# Patient Record
Sex: FEMALE | Race: WHITE | Employment: OTHER | URBAN - NONMETROPOLITAN AREA
[De-identification: names, ages, dates, MRNs, and addresses within clinical notes are randomized per-mention and may not be internally consistent; named-entity substitution may affect disease eponyms.]

---

## 2018-08-09 ENCOUNTER — HOSPITAL ENCOUNTER (OUTPATIENT)
Dept: MAMMOGRAPHY | Age: 63
Discharge: HOME | End: 2018-08-09
Attending: FAMILY MEDICINE | Admitting: FAMILY MEDICINE

## 2018-08-09 DIAGNOSIS — Z12.31 VISIT FOR SCREENING MAMMOGRAM: ICD-10-CM

## 2019-08-14 ENCOUNTER — HOSPITAL ENCOUNTER (OUTPATIENT)
Dept: MAMMOGRAPHY | Age: 64
Discharge: HOME | End: 2019-08-14
Payer: COMMERCIAL

## 2019-08-14 DIAGNOSIS — Z12.31 SCREENING MAMMOGRAM, ENCOUNTER FOR: ICD-10-CM

## 2019-08-14 PROCEDURE — 77063 BREAST TOMOSYNTHESIS BI: CPT

## 2020-08-17 ENCOUNTER — HOSPITAL ENCOUNTER (OUTPATIENT)
Dept: MAMMOGRAPHY | Age: 65
Discharge: HOME | End: 2020-08-17
Payer: COMMERCIAL

## 2020-08-17 PROCEDURE — 77063 BREAST TOMOSYNTHESIS BI: CPT

## 2021-02-01 ENCOUNTER — OFFICE VISIT (OUTPATIENT)
Dept: INTERNAL MEDICINE CLINIC | Age: 66
End: 2021-02-01
Payer: MEDICARE

## 2021-02-01 VITALS
BODY MASS INDEX: 31.65 KG/M2 | TEMPERATURE: 97.8 F | DIASTOLIC BLOOD PRESSURE: 70 MMHG | OXYGEN SATURATION: 98 % | HEIGHT: 62 IN | SYSTOLIC BLOOD PRESSURE: 120 MMHG | HEART RATE: 67 BPM | WEIGHT: 172 LBS

## 2021-02-01 DIAGNOSIS — J45.990 EXERCISE-INDUCED ASTHMA: ICD-10-CM

## 2021-02-01 DIAGNOSIS — Z78.0 POST-MENOPAUSAL: ICD-10-CM

## 2021-02-01 DIAGNOSIS — M51.36 LUMBAR DEGENERATIVE DISC DISEASE: ICD-10-CM

## 2021-02-01 DIAGNOSIS — E78.5 DYSLIPIDEMIA: ICD-10-CM

## 2021-02-01 DIAGNOSIS — M54.32 LEFT SIDED SCIATICA: Primary | ICD-10-CM

## 2021-02-01 PROBLEM — M51.369 LUMBAR DEGENERATIVE DISC DISEASE: Status: ACTIVE | Noted: 2021-02-01

## 2021-02-01 PROCEDURE — 99204 OFFICE O/P NEW MOD 45 MIN: CPT | Performed by: INTERNAL MEDICINE

## 2021-02-01 RX ORDER — ESTRADIOL 0.5 MG/1
0.5 TABLET ORAL DAILY
COMMUNITY
Start: 2020-09-03 | End: 2021-08-02

## 2021-02-01 RX ORDER — KETOCONAZOLE 20 MG/ML
SHAMPOO TOPICAL
COMMUNITY
Start: 2020-11-18

## 2021-02-01 RX ORDER — KETOCONAZOLE 20 MG/G
CREAM TOPICAL 2 TIMES DAILY
COMMUNITY
End: 2021-02-01

## 2021-02-01 RX ORDER — ESTRADIOL 0.1 MG/G
2 CREAM VAGINAL DAILY
COMMUNITY
End: 2021-08-02

## 2021-02-01 RX ORDER — TRIAMCINOLONE ACETONIDE 1 MG/G
CREAM TOPICAL
COMMUNITY
Start: 2020-11-18

## 2021-02-01 RX ORDER — ALBUTEROL SULFATE 90 UG/1
2 AEROSOL, METERED RESPIRATORY (INHALATION) EVERY 4 HOURS PRN
COMMUNITY
Start: 2020-08-03 | End: 2021-09-15 | Stop reason: SDUPTHER

## 2021-02-01 SDOH — HEALTH STABILITY: MENTAL HEALTH: HOW OFTEN DO YOU HAVE A DRINK CONTAINING ALCOHOL?: NEVER

## 2021-02-01 ASSESSMENT — PATIENT HEALTH QUESTIONNAIRE - PHQ9
1. LITTLE INTEREST OR PLEASURE IN DOING THINGS: 0
SUM OF ALL RESPONSES TO PHQ QUESTIONS 1-9: 0

## 2021-02-01 NOTE — PROGRESS NOTES
2/1/21    Romel Celeste  1955    Chief Complaint   Patient presents with    New Patient     c/o numbness left and right toes       History of Present Illness:  Romel Celeste is a 73 y.o. pleasant lady presenting today to establish care as a new patient with a chief complaint of chronic LBP. She has a past medical history significant for:  HL (, TG 54 on 7/6/2020 care everywhere)  Degenerative disc disease lumbar and L sciatica, on Ibuprofen PRN  Post-menopausal, on hormonal therapy  Exercise-induced asthma (?emphysema), on Albuterol inh PRN   S/p tubal ligation   S/p bilateral carpal tunnel release  S/p tonsillectomy   Former smoker (quit 2008), on Nicorette gum    # Patient with sciatica and DDD lumbar. Intermittent. Better once she retired. She used to be a . Mostly L sciatica is bothering her. Takes Ibuprofen PRN. Did not tolerate Gabapentin. Pain keeps her up at night. She had XR lumbar done 2018. She has done physical therapy in the past.   Also having numbness in L > R toes intermittently. No saddle anesthesia or weakness in lower extremities. No incontinence. # UTD on mammogram done Aug 2020 unremarkable. # Patient had colonoscopy done 10 years ago. Due for colon cancer screening this year. Wants to hold off. # UTD on pap smears. Last one was 2 years ago. # Per patient she was having severe post-menopausal symptoms that her PCP had to place her on hormonal therapy. Was not seeing Gyn for this. # Has exercise-induced asthma. Uses Albuterol inh PRN. Has bronchospasms when she goes to walk in the cold. Albuterol helps. # Continues to chew nicorette gum. Has been unable to quit that. She quit smoking in 2008. Has tried switching to regular chewing gum but unable to tolerate.        Health maintenance:   Health Maintenance Due   Topic Date Due    Hepatitis C screen  1955    HIV screen  01/12/1971    Cervical cancer screen  01/12/1977  Diabetes screen  01/12/1996    Colon cancer screen colonoscopy  01/12/2006    DEXA (modify frequency per FRAX score)  01/12/2011    Lipid screen  07/11/2019    Flu vaccine (1) 09/01/2020    Pneumococcal 65+ years Vaccine (1 of 1 - PPSV23) 01/12/2021    Annual Wellness Visit (AWV)  02/01/2021         Review of Systems:  Constitutional: no fevers, no chills, no night sweats, no weight loss, no weight gain, no fatigue   Pain assessment: chronic LBP and L sciatica   Head: no headaches  Ears: no hearing loss, no tinnitus, no vertigo  Eyes: no blurry vision, no diplopia, no dryness, no itchiness  Mouth: no oral ulcers, no dry mouth, no sore throat  Nose: no nasal congestion, no epistaxis  Cardiac: no chest pain, no palpitations, no leg swelling, no orthopnea, no PND, no syncope  Pulmonary: no dyspnea, no cough, no wheezing, no hemoptysis  GI: no nausea, no vomiting, no diarrhea, no constipation, no abdominal pain, no hematochezia  : no dysuria, no frequency, no urgency, no hematuria, no frothy urine, no dyspareunia, no pelvic pain, no vaginal bleeding, no abnormal vaginal discharge  MSK: no arthralgias, no myalgias, no early morning stiffness, Raynaud's, numbness L > R toes intermittently   Neuro: no focal neurological deficits, no seizures  Sleep: no snoring, no daytime somnolence   Psych: no depression, no anxiety, no suicidal ideation      Physical Exam:  VITALS:   /70   Pulse 67   Temp 97.8 °F (36.6 °C)   Ht 5' 2\" (1.575 m)   Wt 172 lb (78 kg)   SpO2 98%   BMI 31.46 kg/m²     PHYSICAL EXAMINATION:  General: alert, awake, and oriented to time, place, person, and situation. Not in acute distress   Skin:  no suspicious rashes, no jaundice  Head: normocephalic/atraumatic  Eyes: anicteric sclera, well-injected conjunctiva. Pupils are equally round and reactive to light.  Extraocular movements are intact   Nose: no septal deviation evident  Sinuses: no sinus tenderness  Ears: external ears normal  Neck: supple, no cervical lymphadenopathy, thyroid symmetric and not enlarged, no bruits   Heart: regular rate and rhythm, regular S1/S2, no S3/S4, no audible murmurs, no audible friction rub  Lungs: clear to auscultation bilaterally, no audible crackles, no audible wheezes, no audible rhonchi    Abdomen: normal bowel sounds, soft abdomen, non-tender, no palpable masses  Extremities: no edema, warm, no cyanosis, no clubbing. Good capillary refill   MSK: no tenderness across spinous processes, full ROM in all 4 extremities. No joint swelling or tenderness   Peripheral vascular: 2+ pulses symmetric (radial and dorsalis pedis)  Neuro: gait normal, CN II-XII intact, motor power 5/5 in all 4 extremities, sensation intact and symmetric    Labs   I have personally reviewed labs, and discussed pertinent findings with patient     Imaging   I have personally reviewed imaging, and discussed pertinent findings with patient    Other notes  I have personally reviewed other notes, and discussed pertinent findings with patient      Assessment/Plan:     1. Left sided sciatica  2. Lumbar degenerative disc disease  Supportive management recommended such as heating pad, Tylenol/Ibuprofen PRN, topicals  Strongly recommended PT. If no improvement in symptoms may need MRI due to numbness in toes ? claudication (less likely vascular and more likely neurogenic)  - [facility]    3. Dyslipidemia  Stable  , TG 54 in July 2020 care everywhere  Diet controlled  Not on statin therapy     4. Post-menopausal  Stable  Discussed with patient long-term side effects of hormonal therapy and she wishes to continue as prescribed by her prior PCP     5. Exercise-induced asthma  Stable  Continue Albuterol inh PRN       Care discussed with patient and questions answered. Patient verbalizes understanding and agrees with plan. Discussed with patient the importance of continuity of care.  I encouraged patient to schedule next appointment within 6 months with me. Patient prefers to be reached by Phone call at [phone] for future medical correspondence. Encouraged to activate MyChart. I reviewed and reconciled the medications this visit. I reviewed and updated the past medical, surgical, social, and family history during this visit. After visit summary provided.        Morris Beltrán MD  Internal Medicine  2/1/2021   10:01 AM

## 2021-02-10 ENCOUNTER — HOSPITAL ENCOUNTER (OUTPATIENT)
Dept: PHYSICAL THERAPY | Age: 66
Setting detail: THERAPIES SERIES
Discharge: HOME | End: 2021-02-10
Payer: MEDICARE

## 2021-02-10 ENCOUNTER — PATIENT MESSAGE (OUTPATIENT)
Dept: INTERNAL MEDICINE CLINIC | Age: 66
End: 2021-02-10

## 2021-02-10 PROCEDURE — 97110 THERAPEUTIC EXERCISES: CPT

## 2021-02-10 PROCEDURE — 97161 PT EVAL LOW COMPLEX 20 MIN: CPT

## 2021-02-10 ASSESSMENT — PAIN DESCRIPTION - LOCATION: LOCATION: BACK

## 2021-02-10 ASSESSMENT — PAIN DESCRIPTION - ORIENTATION: ORIENTATION: RIGHT;LEFT;LOWER

## 2021-02-10 ASSESSMENT — PAIN SCALES - GENERAL: PAINLEVEL_OUTOF10: 7

## 2021-02-10 ASSESSMENT — PAIN DESCRIPTION - FREQUENCY: FREQUENCY: CONTINUOUS

## 2021-02-10 NOTE — PLAN OF CARE
Outpatient Physical Therapy           [place]           [] Phone: [phone]   Fax: [phone]  [place]           [x] Phone: [phone]   Fax: [phone]     To: Referring Practitioner: Danielle Ornelas    From: Terra Mosqueda, PT     Patient: Candy Dubon       : 1955  Diagnosis: Diagnosis: L sciatica   Treatment Diagnosis:   LBP/ L leg pan  Date: 2/10/2021    Physical Therapy Certification/Re-Certification Form    The following patient has been evaluated for physical therapy services and for therapy to continue, insurance requires physician review of the treatment plan initially and every 90 days. Please review the attached evaluation and/or summary of the patient's plan of care, and verify that you agree therapy should continue by signing the attached document and sending it back to our office. ASSESSMENT  Patient primary complaints: LBP/L leg pain  History of condition:Hx of LBP x 3 years - has had L/R toe numbess x 2 months; has constant LBP  Current functional limitations: : difficulty with carrying bags weighing 40# due to back pain; bathing dog in bath tub  Clinical findings: 10 sit to stands with arms across chest in 30 sec;  Mod Oswestry 16/50;Lumbar: AROM WFL all directions, R/L SB painful on contralateral sides;diffciulty with core contriol actvity when performing double leg bridge  PLOF:, retired , able to walk 3 miles without difficulty  Skilled PT interventions are intended to: address and decrease LBP/L leg pain which will enable patient  to complete all necessary ADL:/IADL in a timely manner without difficulty  Patient agrees with established plan of care and assisted in the development of their  goals  Barriers to learning:none- no mental/cognitive barriers observed  Preferred learning style(s):   written- demonstration -practice  Preferred Language: English  Potential barriers to progress:none  The patient appears motivated to participate in PT  yes  Plan of Care/Treatment to date:  [x] Therapeutic Exercise  [] Modalities:  [x] Therapeutic Activity     [] Ultrasound  [] Electrical Stimulation  [] Gait Training      [] Cervical Traction [] Lumbar Traction  [x] Neuromuscular Re-education    [] Cold/hotpack [] Iontophoresis   [x] Instruction in HEP      [] Vasopneumatic    [] Dry Needling  [x] Manual Therapy               [] Aquatic Therapy       Other:          Frequency/Duration:  # Days per week: [x] 1 day # Weeks: [] 1 week [] 5 weeks     [x] 2 days   [] 2 weeks [x] 6 weeks     [] 3 days   [] 3 weeks [] 7 weeks     [] 4 days   [] 4 weeks [] 8 weeks         [] 9 weeks [] 10 weeks         [] 11 weeks [] 12 weeks    Rehab Potential/Progress: [] Excellent [x] Good [] Fair  [] Poor     Goals:         Long term goals  Time Frame for Long term goals : 6 weeks 3/30/21  Long term goal 1: patient's goal - learn how to take care of back  Long term goal 2: patient will score no greater than 12/50 on Modified Oswestry and will report less difficulty with carrying bags wieghing up to 40# and during homemaking task of bathing her dog in the bathtub  Long term goal 3: patient will be independent with HEP in order to prepared for discharge      Electronically signed by:  Charla Trevizo PT, 2/10/2021, 12:42 PM        If you have any questions or concerns, please don't hesitate to call.   Thank you for your referral.      Physician Signature:________________________________Date:_________ TIME: _____  By signing above, therapists plan is approved by physician

## 2021-02-10 NOTE — FLOWSHEET NOTE
Outpatient Physical Therapy  [place]           [x] Phone: [phone]   Fax: [phone]  [facility]           [] Phone: [phone]   Fax: [phone]        Physical Therapy Daily Treatment Note  Date:  2/10/2021    Patient Name:  Floyd Bishop    :  1955  MRN: [id number]  Restrictions/Precautions: Other position/activity restrictions: none  Diagnosis:   Diagnosis: L sciatica  Date of Injury/Surgery:   Treatment Diagnosis:      LBP/ L leg pan  Insurance/Certification information: PT Insurance Information: needs pre certed through [employer]   Referring Physician:  Referring Practitioner: Ramona Reyes  Next Doctor Visit:    Plan of care signed (Y/N):    Outcome Measure:  10 sit to stands with arms across chest in 30 sec; Mod Oswestry 16/50  Visit# / total visits:   then PN plan expires 3/30/21  Pain level: 7/10  LBP   Goals:          Long term goals  Time Frame for Long term goals : 6 weeks 3/30/21  Long term goal 1: patient's goal - learn how to take care of back  Long term goal 2: patient will score no greater than 12/50 on Modified Oswestry and will report less difficulty with carrying bags wieghing up to 40# and during homemaking task of bathing her dog in the bathtub  Long term goal 3: patient will be independent with HEP in order to prepared for discharge    Summary of Evaluation:   Patient primary complaints: LBP/L leg pain  History of condition:Hx of LBP x 3 years - has had L/R toe numbess x 2 months; has constant LBP  Current functional limitations: : difficulty with carrying bags weighing 40# due to back pain; bathing dog in bath tub  Clinical findings: 10 sit to stands with arms across chest in 30 sec;  Mod Oswestry 16/50;Lumbar: AROM WFL all directions, R/L SB painful on contralateral sides;diffciulty with core contriol actvity when performing double leg bridge  PLOF:, retired , able to walk 3 miles without difficulty  Skilled PT interventions are intended to: address and decrease LBP/L leg pain which will enable patient  to complete all necessary ADL:/IADL in a timely manner without difficulty  Patient agrees with established plan of care and assisted in the development of their  goals  Barriers to learning:none- no mental/cognitive barriers observed  Preferred learning style(s):   written- demonstration -practice  Preferred Language: English  Potential barriers to progress:none  The patient appears motivated to participate in PT  yes      Subjective:  See eval         Any changes in Ambulatory Summary Sheet? None        Objective:  See eval   Prior to today's treatment session, patient was screened for signs and symptoms related to COVID-19 including but not limited to verbally answering questions related to feeling ill, cough, or SOB, along with taking temperature via forehead thermometer. Patient presented with all negative signs and symptoms and had no fever >100 degrees Fahrenheit this date. Exercises: (No more than 4 columns)   Exercise/Equipment Date  2/10/21 Date Date           WARM UP         nustep Seat 8 arms 7 load 3 x 5'           TABLE      Side ABD YTB @ knees x10 reps R/l     Piriformis stretch  hooklying with Ankle on contralateral knee - overpress ure applied to medial knee                          STANDING      FM step outs holding handle @ mid chest 7# to R/l x 5 reps ea                                              PROPRIOCEPTION                                    MODALITIES                      Other Therapeutic Activities/Education:        Home Exercise Program:  Side ABD with YTB and piriformis stretching- patient demonstrated and expressed understanding      Manual Treatments:roller to B piriformis        Modalities:        Communication with other providers:        Assessment:  (Response towards treatment session) (Pain Rating)  Pt tolerated  treatment without any adverse reactions or complications this date.  . Pt would continue to benefit from skilled - - -

## 2021-02-10 NOTE — PROGRESS NOTES
Physical Therapy  Initial Assessment  Date: 2/10/2021  Patient Name: Elana Cardoso  MRN: [id number]  : 1955          Restrictions  Position Activity Restriction  Other position/activity restrictions: none    Subjective   General  Chart Reviewed: Yes  Patient assessed for rehabilitation services?: Yes  Family / Caregiver Present: No  Referring Practitioner: Zackary Ornelas  Diagnosis: L sciatica  Follows Commands: Within Functional Limits  General Comment  Comments: difficulty with carrying bags weighing 40# due to back pain; bathing dog in bath tub   PT Visit Information  PT Insurance Information: needs pre certed through [employer]  Subjective  Subjective: Hx of LBP x 3 years - has had L/R toe numbess x 2 months; has constant LBP  Pain Screening  Patient Currently in Pain: Yes  Pain Assessment  Pain Assessment: 0-10  Pain Level: 7  Pain Type: Chronic pain  Pain Location: Back  Pain Orientation: Right;Left;Lower  Pain Radiating Towards: L leg to ankle  Pain Frequency: Continuous  Clinical Progression: Gradually worsening  Functional Pain Assessment: Prevents or interferes with all active and some passive activities  Vital Signs  Patient Currently in Pain: Yes    Vision/Hearing  Vision  Vision: (glasses)  Hearing  Hearing: Within functional limits    Orientation  Orientation  Overall Orientation Status: Within Normal Limits    Social/Functional History  Social/Functional History  Lives With: Spouse  Type of Home: House  Home Layout: Performs ADL's on one level  ADL Assistance: Independent  Homemaking Assistance: Independent  Ambulation Assistance: Independent  Transfer Assistance: Independent  Active : Yes  Mode of Transportation: Car  Occupation: Retired  Leisure & Hobbies: gardening    Objective     Observation/Palpation  Palpation: lumbar spine TTP    AROM RLE (degrees)  RLE AROM: WNL  AROM LLE (degrees)  LLE AROM : WNL  Spine  Lumbar: AROM WFL all directions, R/L SB painful on conralateral sides    Strength RLE  Strength RLE: Exception  R Hip Extension: At least;4/5;4-/5  R Hip ABduction: At least;4/5;4-/5  Strength LLE  Strength LLE: Exception  L Hip Extension: At least;4/5  L Hip ABduction: At least;4/5  Strength Other  Other: diffciulty with core contriol actvity when performing double leg bridge     Additional Measures  Flexibility: L pirifomis stretch painful                Ambulation  Ambulation?: Yes  Ambulation 1  Surface: carpet  Device: No Device  Gait Deviations: None                            Assessment   Conditions Requiring Skilled Therapeutic Intervention  Body structures, Functions, Activity limitations: Increased pain;Decreased strength  Assessment: 10 sit to stands with arms across chest in 30 sec;  Mod Oswestry 16/50  Prognosis: Good  Decision Making: Low Complexity  History: no PF  Exam: Mod Oswestry, trunk ROM  Clinical Presentation: changing characterstics of function due t0 pain  Barriers to Learning: none  REQUIRES PT FOLLOW UP: Yes         Plan        G-Code       OutComes Score                                                  AM-PAC Score             Goals  Long term goals  Time Frame for Long term goals : 6 weeks 3/30/21  Long term goal 1: patient's goal - learn how to take care of back  Long term goal 2: patient will score no greater than 12/50 on Modified Oswestry and will report less difficulty with carrying bags wieghing up to 40# and during homemaking task of bathing her dog in the bathtub  Long term goal 3: patient will be independent with HEP in order to prepared for discharge       Therapy Time   Individual Concurrent Group Co-treatment   Time In 1025         Time Out 1115         Minutes 50         Timed Code Treatment Minutes: [address] [facility]

## 2021-02-10 NOTE — TELEPHONE ENCOUNTER
From: Candy Dubon  To: Natty Hoyt MD  Sent: 2/10/2021 7:09 AM EST  Subject: Visit Pamelia Sorrow Dr. Beverli Cushing,  I tried Tylenol per your instructions concerning pain relief for DDD and sciatica pain. The Tylenol made me feel like I had restless legs, a really uncomfortable feeling in my legs. Is there another OTC medicine you would recommend I try instead of Ibuprofen?   Hayley Eaton

## 2021-02-24 ENCOUNTER — HOSPITAL ENCOUNTER (OUTPATIENT)
Dept: PHYSICAL THERAPY | Age: 66
Setting detail: THERAPIES SERIES
Discharge: HOME | End: 2021-02-24
Payer: MEDICARE

## 2021-02-24 PROCEDURE — 97110 THERAPEUTIC EXERCISES: CPT

## 2021-02-24 PROCEDURE — 97140 MANUAL THERAPY 1/> REGIONS: CPT

## 2021-02-24 NOTE — FLOWSHEET NOTE
Outpatient Physical Therapy  [place]           [x] Phone: [phone]   Fax: [phone]  [place]           [] Phone: [phone]   Fax: [phone]        Physical Therapy Daily Treatment Note  Date:  2021    Patient Name:  Marely Montes De Oca    :  1955  MRN: [id number]  Restrictions/Precautions: Other position/activity restrictions: none  Diagnosis:   Diagnosis: L sciatica  Date of Injury/Surgery:   Treatment Diagnosis:      LBP/ L leg pan  Insurance/Certification information: PT Insurance Information: needs pre certed through [employer]   Referring Physician:  Referring Practitioner: Ramona Reyes  Next Doctor Visit:    Plan of care signed (Y/N):    Outcome Measure:  10 sit to stands with arms across chest in 30 sec; Mod Oswestry 16/50  Visit# / total visits:   then PN plan expires 3/30/21  Pain level: 4/10  LBP   Goals:          Long term goals  Time Frame for Long term goals : 6 weeks 3/30/21  Long term goal 1: patient's goal - learn how to take care of back  Long term goal 2: patient will score no greater than 12/50 on Modified Oswestry and will report less difficulty with carrying bags wieghing up to 40# and during homemaking task of bathing her dog in the bathtub  Long term goal 3: patient will be independent with HEP in order to prepared for discharge    Summary of Evaluation:   Patient primary complaints: LBP/L leg pain  History of condition:Hx of LBP x 3 years - has had L/R toe numbess x 2 months; has constant LBP  Current functional limitations: : difficulty with carrying bags weighing 40# due to back pain; bathing dog in bath tub  Clinical findings: 10 sit to stands with arms across chest in 30 sec;  Mod Oswestry 16/50;Lumbar: AROM WFL all directions, R/L SB painful on contralateral sides;diffciulty with core contriol actvity when performing double leg bridge  PLOF:, retired , able to walk 3 miles without difficulty  Skilled PT interventions are intended to: address and decrease LBP/L leg pain which will enable patient  to complete all necessary ADL:/IADL in a timely manner without difficulty  Patient agrees with established plan of care and assisted in the development of their  goals  Barriers to learning:none- no mental/cognitive barriers observed  Preferred learning style(s):   written- demonstration -practice  Preferred Language: English  Potential barriers to progress:none  The patient appears motivated to participate in PT  yes      Subjective: Patient rates her pain 4/10 in the LB and the left LE. Pain in the L LE goes to the knee. Reports that her exercises at home are going okay. Any changes in Ambulatory Summary Sheet? None        Objective:  See eval   Prior to today's treatment session, patient was screened for signs and symptoms related to COVID-19 including but not limited to verbally answering questions related to feeling ill, cough, or SOB, along with taking temperature via forehead thermometer. Patient presented with all negative signs and symptoms and had no fever >100 degrees Fahrenheit this date.          Exercises: (No more than 4 columns)   Exercise/Equipment Date  2/10/21 Date  2/24/2021 Date           WARM UP         nustep Seat 8 arms 7 load 3 x 5' Seat 8 arms 7 load 3 x 8'          TABLE      Side ABD YTB @ knees x10 reps R/l YTB @ knees x10 reps R/l    Piriformis stretch  hooklying with Ankle on contralateral knee - overpress ure applied to medial knee hooklying with Ankle on contralateral knee - overpress ure applied to medial knee    Clamshells  #1 YTB 2x10    Seated Russian Twist  2# MB 10x ea     Bridging  Feet on small SB  2x10    OH Lift with SKTC  2# MB 2x10  Feet on small red SB     Resisted rotation with shoulder flexion at 90*    GTB 30\" ea side    STANDING      FM step outs holding handle @ mid chest 7# to R/l x 5 reps ea 7# to R/L  5 reps    Palloff Press  7# 10x ea           Scap Ret, Mid Row, LAE, Lat Pull  GTB 20x3\" PROPRIOCEPTION                                    MODALITIES                      Other Therapeutic Activities/Education:        Home Exercise Program:  Side ABD with YTB and piriformis stretching- patient demonstrated and expressed understanding      Manual Treatments:roller to L piriformis        Modalities:        Communication with other providers:        Assessment:  (Response towards treatment session) (Pain Rating) Patient continued to rate her pain 4/10 in the LB and LLE after treatment. Progressed strengthening exercises this visit without aggravating patient's symptoms.         Plan for Next Session:        Time In / Time Out:   7736/4401           Timed Code/Total Treatment Minutes:  39' (10' MT, 28' TE)    Next Progress Note due:        Plan of Care Interventions:  [x] Therapeutic Exercise  [] Modalities:  [x] Therapeutic Activity     [] Ultrasound  [] Estim  [] Gait Training      [] Cervical Traction [x] Lumbar Traction  [x] Neuromuscular Re-education    [] Cold/hotpack [] Iontophoresis   [x] Instruction in HEP      [] Vasopneumatic   [] Dry Needling    [x] Manual Therapy               [x] Aquatic Therapy              Electronically signed by:  Karan Rizo  2/24/2021, 12:52 PM

## 2021-03-03 ENCOUNTER — PATIENT MESSAGE (OUTPATIENT)
Dept: INTERNAL MEDICINE CLINIC | Age: 66
End: 2021-03-03

## 2021-03-03 ENCOUNTER — HOSPITAL ENCOUNTER (OUTPATIENT)
Dept: PHYSICAL THERAPY | Age: 66
Setting detail: THERAPIES SERIES
Discharge: HOME | End: 2021-03-03
Payer: MEDICARE

## 2021-03-03 PROCEDURE — 97110 THERAPEUTIC EXERCISES: CPT

## 2021-03-03 NOTE — FLOWSHEET NOTE
flexion at 80*    GTB 30\" ea side GTB 30\"x2 ea side   STANDING      FM step outs holding handle @ mid chest 7# to R/l x 5 reps ea 7# to R/L  5 reps 7# to R/L  5 reps   Palloff Press  7# 10x ea  7# 10x ea          Scap Ret, Mid Row, LAE, Lat Pull  GTB 20x3\"  GTB 20x3\" ea                           PROPRIOCEPTION                                    MODALITIES                      Other Therapeutic Activities/Education:        Home Exercise Program:  Progressed HEP to include bridging and clamshells 1, 2      Manual Treatments      Modalities:        Communication with other providers:        Assessment:  (Response towards treatment session) (Pain Rating) Patient continued to rate her pain 4/10 in the LB and LLE after treatment. Progressed strengthening exercises this visit without aggravating patient's symptoms.         Plan for Next Session:        Time In / Time Out:  8477/1155        Timed Code/Total Treatment Minutes:  45' TE  Next Progress Note due:        Plan of Care Interventions:  [x] Therapeutic Exercise  [] Modalities:  [x] Therapeutic Activity     [] Ultrasound  [] Estim  [] Gait Training      [] Cervical Traction [x] Lumbar Traction  [x] Neuromuscular Re-education    [] Cold/hotpack [] Iontophoresis   [x] Instruction in HEP      [] Vasopneumatic   [] Dry Needling    [x] Manual Therapy               [x] Aquatic Therapy              Electronically signed by:  Ronald Elias  3/3/2021, 11:15 AM

## 2021-03-03 NOTE — TELEPHONE ENCOUNTER
From: Marely Montes De Oca  To: Gerber Banuelos MD  Sent: 3/3/2021 2:37 PM EST  Subject: Non-Urgent Medical Question    Dear Dr. Semaj Barrios,  I am currently receiving physical therapy and it is going well. But I received an approval from [address] [facility] Requested from your office for \"Application, Modality to 1+ areas; Ultrasound, each 15 minutes\". I have not been contacted to schedule this and I'm not even sure what it is.      I would also like to request a refill of the following from [facility],  Progesterone Micro capsules 100mg generic  Take one capsule by mouth daily  Qty 90    Thank You for your consideration,  Blossom Botello

## 2021-03-10 ENCOUNTER — HOSPITAL ENCOUNTER (OUTPATIENT)
Dept: PHYSICAL THERAPY | Age: 66
Setting detail: THERAPIES SERIES
Discharge: HOME | End: 2021-03-10
Payer: MEDICARE

## 2021-03-10 PROCEDURE — 97110 THERAPEUTIC EXERCISES: CPT

## 2021-03-10 PROCEDURE — 97112 NEUROMUSCULAR REEDUCATION: CPT

## 2021-03-10 NOTE — FLOWSHEET NOTE
Outpatient Physical Therapy  [facility]           [x] Phone: [phone]   Fax: [phone]  [facility]           [] Phone: [phone]   Fax: [phone]        Physical Therapy Daily Treatment Note  Date:  3/10/2021    Patient Name:  Rayna Stevens    :  1955  MRN: [id number]  Restrictions/Precautions: Other position/activity restrictions: none  Diagnosis:   Diagnosis: L sciatica  Date of Injury/Surgery:   Treatment Diagnosis:      LBP/ L leg pan  Insurance/Certification information: PT Insurance Information: needs pre certed through [employer]   Referring Physician:  Referring Practitioner: Ramona Reyes  Next Doctor Visit:    Plan of care signed (Y/N):    Outcome Measure:  10 sit to stands with arms across chest in 30 sec; Mod Oswestry 16/50  Visit# / total visits:   then PN plan expires 3/30/21  Pain level: 5/10  LBP   Goals:          Long term goals  Time Frame for Long term goals : 6 weeks 3/30/21  Long term goal 1: patient's goal - learn how to take care of back  Long term goal 2: patient will score no greater than 12/50 on Modified Oswestry and will report less difficulty with carrying bags wieghing up to 40# and during homemaking task of bathing her dog in the bathtub  Long term goal 3: patient will be independent with HEP in order to prepared for discharge    Summary of Evaluation:   Patient primary complaints: LBP/L leg pain  History of condition:Hx of LBP x 3 years - has had L/R toe numbess x 2 months; has constant LBP  Current functional limitations: : difficulty with carrying bags weighing 40# due to back pain; bathing dog in bath tub  Clinical findings: 10 sit to stands with arms across chest in 30 sec;  Mod Oswestry 16/50;Lumbar: AROM WFL all directions, R/L SB painful on contralateral sides;diffciulty with core contriol actvity when performing double leg bridge  PLOF:, retired , able to walk 3 miles without difficulty  Skilled PT interventions are intended to: address and decrease MB 2x10  Feet on small red SB 2# MB 2x10  Feet on small red SB 2# MB 2x10  Feet on small red SB    Resisted rotation with shoulder flexion at 90*    GTB 30\" ea side GTB 30\"x2 ea side GTB 30\"x2 ea side   STANDING       FM step outs holding handle @ mid chest 7# to R/l x 5 reps ea 7# to R/L  5 reps 7# to R/L  5 reps 10# to R/L  5 reps   Palloff Press  7# 10x ea  7# 10x ea  7# 12 x10 ea          Scap Ret, Mid Row, LAE, Lat Pull  GTB 20x3\"  GTB 20x3\" ea  GTB 20x3\" ea                              PROPRIOCEPTION                                          MODALITIES                         Other Therapeutic Activities/Education: Interventions included  verbal and manual cueing intended to facilitate recruitment and strength of specific muscle groups that are utilized by the patient to perform necessary ADL/IADL. Home Exercise Program:  Progressed HEP to include bridging and clamshells 1, 2      Manual Treatments      Modalities:        Communication with other providers:        Assessment:  (Response towards treatment session) (Pain Rating) Patient continued to rate her pain 5/10 in the LB . Progressed strengthening exercises this visit without aggravating patient's symptoms.         Plan for Next Session:        Time In / Time Out:  1117/1155        Timed Code/Total Treatment Minutes:  10'' NRE x1, 29' TE x2/ 38\"  Next Progress Note due:        Plan of Care Interventions:  [x] Therapeutic Exercise  [] Modalities:  [x] Therapeutic Activity     [] Ultrasound  [] Estim  [] Gait Training      [] Cervical Traction [x] Lumbar Traction  [x] Neuromuscular Re-education    [] Cold/hotpack [] Iontophoresis   [x] Instruction in HEP      [] Vasopneumatic   [] Dry Needling    [x] Manual Therapy               [x] Aquatic Therapy              Electronically signed by:  Sydnie Yanez  3/10/2021, 11:15 AM

## 2021-03-17 ENCOUNTER — HOSPITAL ENCOUNTER (OUTPATIENT)
Dept: PHYSICAL THERAPY | Age: 66
Setting detail: THERAPIES SERIES
Discharge: HOME | End: 2021-03-17
Payer: MEDICARE

## 2021-03-17 PROCEDURE — 97110 THERAPEUTIC EXERCISES: CPT

## 2021-03-17 PROCEDURE — 97112 NEUROMUSCULAR REEDUCATION: CPT

## 2021-03-17 NOTE — FLOWSHEET NOTE
Outpatient Physical Therapy  [facility]           [x] Phone: [phone]   Fax: [phone]  [facility]           [] Phone: [phone]   Fax: [phone]        Physical Therapy Daily Treatment Note  Date:  3/17/2021    Patient Name:  Sean Santos    :  1955  MRN: [id number]  Restrictions/Precautions: Other position/activity restrictions: none  Diagnosis:   Diagnosis: L sciatica  Date of Injury/Surgery:   Treatment Diagnosis:      LBP/ L leg pan  Insurance/Certification information: PT Insurance Information: needs pre certed through [employer]   Referring Physician:  Referring Practitioner: Ramona Reyes  Next Doctor Visit:    Plan of care signed (Y/N):    Outcome Measure:  10 sit to stands with arms across chest in 30 sec; Mod Oswestry 16/50  Visit# / total visits:   then PN plan expires 3/30/21  Pain level: 5/10  LBP   Goals:          Long term goals  Time Frame for Long term goals : 6 weeks 3/30/21  Long term goal 1: patient's goal - learn how to take care of back  Long term goal 2: patient will score no greater than 12/50 on Modified Oswestry and will report less difficulty with carrying bags wieghing up to 40# and during homemaking task of bathing her dog in the bathtub  Long term goal 3: patient will be independent with HEP in order to prepared for discharge    Summary of Evaluation:   Patient primary complaints: LBP/L leg pain  History of condition:Hx of LBP x 3 years - has had L/R toe numbess x 2 months; has constant LBP  Current functional limitations: : difficulty with carrying bags weighing 40# due to back pain; bathing dog in bath tub  Clinical findings: 10 sit to stands with arms across chest in 30 sec;  Mod Oswestry 16/50;Lumbar: AROM WFL all directions, R/L SB painful on contralateral sides;diffciulty with core contriol actvity when performing double leg bridge  PLOF:, retired , able to walk 3 miles without difficulty  Skilled PT interventions are intended to: address and decrease LBP/L leg pain which will enable patient  to complete all necessary ADL:/IADL in a timely manner without difficulty  Patient agrees with established plan of care and assisted in the development of their  goals  Barriers to learning:none- no mental/cognitive barriers observed  Preferred learning style(s):   written- demonstration -practice  Preferred Language: English  Potential barriers to progress:none  The patient appears motivated to participate in PT  yes      Subjective:  Patient rates her pain 5/10 today. Feels that her pain is better since starting therapy. Mostly just dependent on her activity level now. Any changes in Ambulatory Summary Sheet? None        Objective: Modified Oswestry Score: 18/50    Prior to today's treatment session, patient was screened for signs and symptoms related to COVID-19 including but not limited to verbally answering questions related to feeling ill, cough, or SOB, along with taking temperature via forehead thermometer. Patient presented with all negative signs and symptoms and had no fever >100 degrees Fahrenheit this date.          Exercises: (No more than 4 columns)   Exercise/Equipment Date  3/3/2021 3/10/21 3/17/2021           WARM UP         nustep Seat 8 arms 7 load 3 x 10' Seat 8 arms 7 load 3 x 10' Seat 7 arms 7 load 3 x 10'         TABLE      Side ABD YTB @ knees   10x R/L HEP    Piriformis stretch  hooklying with Ankle on contralateral knee - overpres ure applied to medial knee HEP    Clamshells #1 YTB 2x10  #2 2x10 HEP    Seated Russian Twist 2# MB 10x B next 2# MB 10x ea    Bridging Feet on small SB  2x10 Feet on small SB  2x10; bridge with feet on table hands across chest x10 reps Feet on small SB  2x10; bridge with feet on table hands across chest x10 reps   OH Lift with SKTC 2# MB 2x10  Feet on small red SB 2# MB 2x10  Feet on small red SB 2# MB    Resisted rotation with shoulder flexion at 90*   GTB 30\"x2 ea side GTB 30\"x2 ea side Blue TB 30\"x2 ea side STANDING      FM step outs holding handle @ mid chest 7# to R/L  5 reps 10# to R/L  5 reps 10# to R/L  5 reps   Palloff Press 7# 10x ea  7# 12 x10 ea 7# 2x10 ea          Scap Ret, Mid Row, LAE, Lat Pull GTB 20x3\" ea  GTB 20x3\" ea  GTB 20x3\" ea                           PROPRIOCEPTION                                    MODALITIES                      Other Therapeutic Activities/Education: Interventions included  verbal and manual cueing intended to facilitate recruitment and strength of specific muscle groups that are utilized by the patient to perform necessary ADL/IADL. Home Exercise Program:  Progressed HEP to include bridging and clamshells 1, 2      Manual Treatments      Modalities:        Communication with other providers:        Assessment:  (Response towards treatment session) (Pain Rating) Patient continued to rate her pain 5/10 in the LB . Reviewed HEP and patient will continue with this independently.       Plan for Next Session:        Time In / Time Out:  1120/1200        Timed Code/Total Treatment Minutes:  36' (10' NR, 30' TE)  Next Progress Note due:        Plan of Care Interventions:  [x] Therapeutic Exercise  [] Modalities:  [x] Therapeutic Activity     [] Ultrasound  [] Estim  [] Gait Training      [] Cervical Traction [x] Lumbar Traction  [x] Neuromuscular Re-education    [] Cold/hotpack [] Iontophoresis   [x] Instruction in HEP      [] Vasopneumatic   [] Dry Needling    [x] Manual Therapy               [x] Aquatic Therapy              Electronically signed by:  Amber Sal  3/17/2021, 11:19 AM

## 2021-03-18 NOTE — DISCHARGE SUMMARY
Outpatient Physical Therapy           [facility]           [] Phone: [phone]   Fax: [phone]  [facility]           [x] Phone: [phone]   Fax: [phone]      To: Referring Practitioner: Anirudh Ornelas                                     From: Brea Ferrara, PT             Patient: Abiodun Santos                                                     : 1955  Diagnosis: Diagnosis: L sciatica         Treatment Diagnosis:   LBP/ L leg pan       []  Progress Note                [x]  Discharge Note    Evaluation Date: 2/10/21    Total Visits to date:   5-planned for 5 Cancels/No-shows to date:      Subjective:  Patient rates her pain /10 today. Feels that her pain is better since starting therapy. Mostly just dependent on her activity level now.         Plan of Care/Treatment to date:  [x] Therapeutic Exercise    [] Modalities:  [x] Therapeutic Activity     [] Ultrasound  [] Electrical Stimulation  [] Gait Training      [] Cervical Traction   [] Lumbar Traction  [x] Neuromuscular Re-education  [] Cold/hotpack [] Iontophoresis  [x] Instruction in HEP      Other:  [x] Manual Therapy       []  Vasopneumatic  [] Aquatic Therapy       []   Dry Needle Therapy                      Objective/Significant Findings At Last Visit/Comments:  Modified Oswestry Score: 18/50     Goal Status:  [] Achieved [x] Partially Achieved  [] Not Achieved   Long term goal 1: patient's goal - learn how to take care of back-meeting  Long term goal 2: patient will score no greater than 12/50 on Modified Oswestry and will report less difficulty with carrying bags wieghing up to 40# and during homemaking task of bathing her dog in the bathtub-not met  Long term goal 3: patient will be independent with HEP in order to prepared for discharge-meeting      [x] Patient now discharged- no further PT deemed necessary    Electronically signed by:  Brea Ferrara, PT, 3/18/2021, 1:16 PM    If you have any questions or concerns, please don't hesitate to call.   Thank you for your referral.

## 2021-08-02 ENCOUNTER — OFFICE VISIT (OUTPATIENT)
Dept: INTERNAL MEDICINE CLINIC | Age: 66
End: 2021-08-02
Payer: MEDICARE

## 2021-08-02 VITALS
RESPIRATION RATE: 17 BRPM | WEIGHT: 168 LBS | DIASTOLIC BLOOD PRESSURE: 80 MMHG | OXYGEN SATURATION: 99 % | BODY MASS INDEX: 31.72 KG/M2 | HEIGHT: 61 IN | HEART RATE: 66 BPM | SYSTOLIC BLOOD PRESSURE: 128 MMHG

## 2021-08-02 VITALS — BODY MASS INDEX: 29.83 KG/M2 | WEIGHT: 158 LBS | HEIGHT: 61 IN

## 2021-08-02 DIAGNOSIS — J45.990 EXERCISE-INDUCED ASTHMA: ICD-10-CM

## 2021-08-02 DIAGNOSIS — Z91.81 AT HIGH RISK FOR FALLS: ICD-10-CM

## 2021-08-02 DIAGNOSIS — E78.5 DYSLIPIDEMIA: ICD-10-CM

## 2021-08-02 DIAGNOSIS — Z12.11 COLON CANCER SCREENING: ICD-10-CM

## 2021-08-02 DIAGNOSIS — Z78.0 POST-MENOPAUSAL: Primary | ICD-10-CM

## 2021-08-02 DIAGNOSIS — Z00.00 ROUTINE GENERAL MEDICAL EXAMINATION AT A HEALTH CARE FACILITY: Primary | ICD-10-CM

## 2021-08-02 DIAGNOSIS — Z12.31 ENCOUNTER FOR SCREENING MAMMOGRAM FOR BREAST CANCER: ICD-10-CM

## 2021-08-02 DIAGNOSIS — M54.32 LEFT SIDED SCIATICA: ICD-10-CM

## 2021-08-02 DIAGNOSIS — Z23 NEED FOR PROPHYLACTIC VACCINATION AGAINST STREPTOCOCCUS PNEUMONIAE (PNEUMOCOCCUS): ICD-10-CM

## 2021-08-02 LAB
A/G RATIO: 1.8 (ref 1.1–2.2)
ALBUMIN SERPL-MCNC: 4.8 G/DL (ref 3.4–5)
ALP BLD-CCNC: 107 U/L (ref 40–129)
ALT SERPL-CCNC: 11 U/L (ref 10–40)
ANION GAP SERPL CALCULATED.3IONS-SCNC: 16 MMOL/L (ref 3–16)
AST SERPL-CCNC: 20 U/L (ref 15–37)
BASOPHILS ABSOLUTE: 0.1 K/UL (ref 0–0.2)
BASOPHILS RELATIVE PERCENT: 1.1 %
BILIRUB SERPL-MCNC: 0.3 MG/DL (ref 0–1)
BUN BLDV-MCNC: 23 MG/DL (ref 7–20)
CALCIUM SERPL-MCNC: 9.5 MG/DL (ref 8.3–10.6)
CHLORIDE BLD-SCNC: 105 MMOL/L (ref 99–110)
CHOLESTEROL, FASTING: 224 MG/DL (ref 0–199)
CO2: 21 MMOL/L (ref 21–32)
CREAT SERPL-MCNC: 0.8 MG/DL (ref 0.6–1.2)
EOSINOPHILS ABSOLUTE: 0.1 K/UL (ref 0–0.6)
EOSINOPHILS RELATIVE PERCENT: 1.1 %
GFR AFRICAN AMERICAN: >60
GFR NON-AFRICAN AMERICAN: >60
GLOBULIN: 2.7 G/DL
GLUCOSE BLD-MCNC: 95 MG/DL (ref 70–99)
HCT VFR BLD CALC: 45.9 % (ref 36–48)
HDLC SERPL-MCNC: 83 MG/DL (ref 40–60)
HEMOGLOBIN: 15.5 G/DL (ref 12–16)
LDL CHOLESTEROL CALCULATED: 128 MG/DL
LYMPHOCYTES ABSOLUTE: 1.2 K/UL (ref 1–5.1)
LYMPHOCYTES RELATIVE PERCENT: 23.3 %
MCH RBC QN AUTO: 31.9 PG (ref 26–34)
MCHC RBC AUTO-ENTMCNC: 33.7 G/DL (ref 31–36)
MCV RBC AUTO: 94.7 FL (ref 80–100)
MONOCYTES ABSOLUTE: 0.4 K/UL (ref 0–1.3)
MONOCYTES RELATIVE PERCENT: 7.5 %
NEUTROPHILS ABSOLUTE: 3.4 K/UL (ref 1.7–7.7)
NEUTROPHILS RELATIVE PERCENT: 67 %
PDW BLD-RTO: 14.1 % (ref 12.4–15.4)
PLATELET # BLD: 261 K/UL (ref 135–450)
PMV BLD AUTO: 9.1 FL (ref 5–10.5)
POTASSIUM SERPL-SCNC: 3.9 MMOL/L (ref 3.5–5.1)
RBC # BLD: 4.84 M/UL (ref 4–5.2)
SODIUM BLD-SCNC: 142 MMOL/L (ref 136–145)
TOTAL PROTEIN: 7.5 G/DL (ref 6.4–8.2)
TRIGLYCERIDE, FASTING: 65 MG/DL (ref 0–150)
VLDLC SERPL CALC-MCNC: 13 MG/DL
WBC # BLD: 5.1 K/UL (ref 4–11)

## 2021-08-02 PROCEDURE — 99214 OFFICE O/P EST MOD 30 MIN: CPT | Performed by: INTERNAL MEDICINE

## 2021-08-02 PROCEDURE — G0402 INITIAL PREVENTIVE EXAM: HCPCS | Performed by: INTERNAL MEDICINE

## 2021-08-02 PROCEDURE — 90732 PPSV23 VACC 2 YRS+ SUBQ/IM: CPT | Performed by: INTERNAL MEDICINE

## 2021-08-02 PROCEDURE — G0009 ADMIN PNEUMOCOCCAL VACCINE: HCPCS | Performed by: INTERNAL MEDICINE

## 2021-08-02 PROCEDURE — 36415 COLL VENOUS BLD VENIPUNCTURE: CPT | Performed by: INTERNAL MEDICINE

## 2021-08-02 RX ORDER — CLOBETASOL PROPIONATE 0.46 MG/ML
SOLUTION TOPICAL
COMMUNITY
Start: 2021-05-19

## 2021-08-02 RX ORDER — LANOLIN ALCOHOL/MO/W.PET/CERES
2.5 CREAM (GRAM) TOPICAL DAILY
COMMUNITY

## 2021-08-02 RX ORDER — PROGESTERONE 100 MG/1
100 CAPSULE ORAL DAILY
COMMUNITY
Start: 2020-09-03 | End: 2021-08-02

## 2021-08-02 SDOH — ECONOMIC STABILITY: FOOD INSECURITY: WITHIN THE PAST 12 MONTHS, THE FOOD YOU BOUGHT JUST DIDN'T LAST AND YOU DIDN'T HAVE MONEY TO GET MORE.: NEVER TRUE

## 2021-08-02 SDOH — ECONOMIC STABILITY: FOOD INSECURITY: WITHIN THE PAST 12 MONTHS, YOU WORRIED THAT YOUR FOOD WOULD RUN OUT BEFORE YOU GOT MONEY TO BUY MORE.: NEVER TRUE

## 2021-08-02 ASSESSMENT — LIFESTYLE VARIABLES
HOW OFTEN DURING THE LAST YEAR HAVE YOU NEEDED AN ALCOHOLIC DRINK FIRST THING IN THE MORNING TO GET YOURSELF GOING AFTER A NIGHT OF HEAVY DRINKING: 0
HOW OFTEN DURING THE LAST YEAR HAVE YOU HAD A FEELING OF GUILT OR REMORSE AFTER DRINKING: 0
HAVE YOU OR SOMEONE ELSE BEEN INJURED AS A RESULT OF YOUR DRINKING: 0
HOW OFTEN DO YOU HAVE A DRINK CONTAINING ALCOHOL: 3
HOW MANY STANDARD DRINKS CONTAINING ALCOHOL DO YOU HAVE ON A TYPICAL DAY: 0
HOW OFTEN DO YOU HAVE SIX OR MORE DRINKS ON ONE OCCASION: 1
HAS A RELATIVE, FRIEND, DOCTOR, OR ANOTHER HEALTH PROFESSIONAL EXPRESSED CONCERN ABOUT YOUR DRINKING OR SUGGESTED YOU CUT DOWN: 0
HOW OFTEN DURING THE LAST YEAR HAVE YOU BEEN UNABLE TO REMEMBER WHAT HAPPENED THE NIGHT BEFORE BECAUSE YOU HAD BEEN DRINKING: 0
HOW OFTEN DURING THE LAST YEAR HAVE YOU FAILED TO DO WHAT WAS NORMALLY EXPECTED FROM YOU BECAUSE OF DRINKING: 0
HOW OFTEN DURING THE LAST YEAR HAVE YOU FOUND THAT YOU WERE NOT ABLE TO STOP DRINKING ONCE YOU HAD STARTED: 0

## 2021-08-02 ASSESSMENT — SOCIAL DETERMINANTS OF HEALTH (SDOH): HOW HARD IS IT FOR YOU TO PAY FOR THE VERY BASICS LIKE FOOD, HOUSING, MEDICAL CARE, AND HEATING?: NOT HARD AT ALL

## 2021-08-02 ASSESSMENT — PATIENT HEALTH QUESTIONNAIRE - PHQ9
SUM OF ALL RESPONSES TO PHQ9 QUESTIONS 1 & 2: 2
SUM OF ALL RESPONSES TO PHQ QUESTIONS 1-9: 2
SUM OF ALL RESPONSES TO PHQ QUESTIONS 1-9: 2
2. FEELING DOWN, DEPRESSED OR HOPELESS: 1
1. LITTLE INTEREST OR PLEASURE IN DOING THINGS: 1
SUM OF ALL RESPONSES TO PHQ QUESTIONS 1-9: 2

## 2021-08-02 NOTE — PATIENT INSTRUCTIONS
Personalized Preventive Plan for French Sánchez - 8/2/2021  Medicare offers a range of preventive health benefits. Some of the tests and screenings are paid in full while other may be subject to a deductible, co-insurance, and/or copay. Some of these benefits include a comprehensive review of your medical history including lifestyle, illnesses that may run in your family, and various assessments and screenings as appropriate. After reviewing your medical record and screening and assessments performed today your provider may have ordered immunizations, labs, imaging, and/or referrals for you. A list of these orders (if applicable) as well as your Preventive Care list are included within your After Visit Summary for your review. Other Preventive Recommendations:    · A preventive eye exam performed by an eye specialist is recommended every 1-2 years to screen for glaucoma; cataracts, macular degeneration, and other eye disorders. · A preventive dental visit is recommended every 6 months. · Try to get at least 150 minutes of exercise per week or 10,000 steps per day on a pedometer . · Order or download the FREE \"Exercise & Physical Activity: Your Everyday Guide\" from [employer]. Call 8-416.784.6409 or search [employer] online. · You need 4401-4756 mg of calcium and 4546-3599 IU of vitamin D per day. It is possible to meet your calcium requirement with diet alone, but a vitamin D supplement is usually necessary to meet this goal.  · When exposed to the sun, use a sunscreen that protects against both UVA and UVB radiation with an SPF of 30 or greater. Reapply every 2 to 3 hours or after sweating, drying off with a towel, or swimming. · Always wear a seat belt when traveling in a car. Always wear a helmet when riding a bicycle or motorcycle.

## 2021-08-02 NOTE — PROGRESS NOTES
8/2/21    Aundrea Marquez  1955    Chief Complaint   Patient presents with    6 Month Follow-Up       History of Present Illness:  Aundrea Marquez is a 73 y.o. pleasant lady presenting today with a chief complaint of post-menopausal hormonal therapy needs, HL. She has a past medical history significant for:  HL (, TG 54 on 7/6/2020 care everywhere)  Degenerative disc disease lumbar and L sciatica, on Ibuprofen PRN  Post-menopausal, on hormonal therapy  Exercise-induced asthma (?emphysema), on Albuterol inh PRN   S/p tubal ligation   S/p bilateral carpal tunnel release  S/p tonsillectomy   Former smoker (quit 2008)  Medical marijuana     # Patient with sciatica and DDD lumbar. Intermittent. Better once she retired. She used to be a . Mostly L sciatica is bothering her. Takes Ibuprofen PRN. Did not tolerate Gabapentin. Pain kept her up at night. She had XR lumbar done 2018. Also having numbness in L > R toes intermittently. No saddle anesthesia or weakness in lower extremities. No incontinence. S/p PT. She is using medical marijuana. # Needs mammogram this year. # Patient had colonoscopy done 10 years ago. Due for colon cancer screening this year. Wants to hold off. # UTD on pap smears. Last one was 2 years ago. # Per patient she was having severe post-menopausal symptoms that her PCP had to place her on hormonal therapy. Was not seeing Gyn for this. She is on Estrogen cream and vaginal supp, as well as progesterone cream.   However due to finances patient is wanting to switch to estrogen/progesterone cream.    # Has exercise-induced asthma. Uses Albuterol inh PRN. Has bronchospasms when she goes to walk in the cold. Albuterol helps. # She quit smoking in 2008. Recently quit nicorette gum. Went to hypnotist.   # Reports her prior PCP said she no longer needs LDCT due to adequate follow up in the past.   # UTD on COVID-19 vaccination.   # Agreeable to jaundice  Head: normocephalic/atraumatic  Eyes: anicteric sclera, well-injected conjunctiva. Pupils are equally round and reactive to light. Extraocular movements are intact   Nose: no septal deviation evident  Sinuses: no sinus tenderness  Ears: external ears normal  Neck: supple, no cervical lymphadenopathy, thyroid symmetric and not enlarged, no bruits   Heart: regular rate and rhythm, regular S1/S2, no S3/S4, no audible murmurs, no audible friction rub  Lungs: clear to auscultation bilaterally, no audible crackles, no audible wheezes, no audible rhonchi    Abdomen: normal bowel sounds, soft abdomen, non-tender, no palpable masses  Extremities: no edema, warm, no cyanosis, no clubbing. Good capillary refill   MSK: no tenderness across spinous processes, full ROM in all 4 extremities. No joint swelling or tenderness   Peripheral vascular: 2+ pulses symmetric (radial)  Neuro: gait normal, CN II-XII intact, motor power 5/5 in all 4 extremities, sensation intact and symmetric    Labs   I have personally reviewed labs, and discussed pertinent findings with patient on this date 8/2/2021     Imaging   I have personally reviewed imaging, and discussed pertinent findings with patient on this date 8/2/2021     Other notes  I have personally reviewed other notes, and discussed pertinent findings with patient on this date 8/2/2021       Assessment/Plan:     1. Post-menopausal  Discussed long-term effects of hormonal replacement therapy. Patient has been on it for > 20 years   - DEXA BONE DENSITY AXIAL SKELETON; Future  - UNABLE TO FIND; C-Progesterone 100mg/gm cream  C-Estradiol 0.1 mg/gm paraben-free cream  C-E2 0.15/E3 0.225 mg/gm cream   Apply each cream daily vaginally  Dispense: 1 each; Refill: 3    2. Exercise-induced asthma  Continue Albuterol inh PRN     3. Left sided sciatica  S/p PT  Stable pain  Does exercises at home  Continue OTC supportive care     4.  Dyslipidemia  , TG 54 in July 2020 care everywhere  Needs updated labs  Not on statin   - CBC Auto Differential; Future  - COMPREHENSIVE METABOLIC PANEL; Future  - Lipid, Fasting; Future    5. At high risk for falls  On the basis of positive falls risk screening, assessment and plan is as follows: home safety tips provided. 6. Encounter for screening mammogram for breast cancer  - [facility] VIRY DIGITAL SCREEN BILATERAL    7. Colon cancer screening  - [facility] - Debby William MD, Gastroenterology, [place]    8. Need for prophylactic vaccination against Streptococcus pneumoniae (pneumococcus)  - PNEUMOVAX 23 subcutaneous/IM (Pneumococcal polysaccharide vaccine 23-valent >= 3yo)      Care discussed with patient and questions answered. Patient verbalizes understanding and agrees with plan. Discussed with patient the importance of continuity of care. I encouraged patient to schedule next appointment within 1 year with me. Patient prefers to be reached by Phone call at [phone] for future medical correspondence. Encouraged to activate [employer]. I reviewed and reconciled the medications this visit. I reviewed and updated the past medical, surgical, social, and family history during this visit. After visit summary provided.        Jesus Manuel Estrella MD  Internal Medicine  8/2/2021   1:19 PM

## 2021-08-02 NOTE — PROGRESS NOTES
Medicare Annual Wellness Visit  Name: Jordan Day Date: 2021   MRN: [id number] Sex: Female   Age: 73 y.o. Ethnicity: Non- / Non    : 1955 Race: White (non-)      Keila Dunbar is here for Other (annual wellness visit)    Screenings for behavioral, psychosocial and functional/safety risks, and cognitive dysfunction are all negative except as indicated below. These results, as well as other patient data from the [address] form, are documented in Flowsheets linked to this Encounter. No Known Allergies      Prior to Visit Medications    Medication Sig Taking? Authorizing Provider   progesterone (PROMETRIUM) 100 MG CAPS capsule Take 100 mg by mouth daily  Patient not taking: Reported on 2021  Historical Provider, MD   clobetasol (TEMOVATE) 0.05 % external solution APPLY TO ITCHY AREAS ON SCALP AND EARS TWICE DAILY FOR UP TO 2 WEEKS. REPEAT AS NEEDED  Historical Provider, MD   melatonin 3 MG TABS tablet Take 2.5 mg by mouth daily  Historical Provider, MD   progesterone (PROMETRIUM) 100 MG capsule Take 1 capsule by mouth daily  Tracie Mckeon MD   tretinoin (RETIN-A) 0.025 % cream Apply topically nightly Apply topically nightly.   Historical Provider, MD   albuterol sulfate  (90 Base) MCG/ACT inhaler Inhale 2 puffs into the lungs every 4 hours as needed  Historical Provider, MD   triamcinolone (KENALOG) 0.1 % cream APPLY CREAM TOPICALLY TWICE DAILY  Historical Provider, MD   estradiol (ESTRACE) 0.1 MG/GM vaginal cream Place 2 g vaginally daily  Historical Provider, MD   estradiol (ESTRACE) 0.5 MG tablet Take 0.5 mg by mouth daily  Patient not taking: Reported on 2021  Historical Provider, MD   ketoconazole (NIZORAL) 2 % shampoo WASH SCALP AND EARS DAILY OR EVERY OTHER DAY LET SIT FOR 5 MINUTES AS DIRECTED  Historical Provider, MD         Past Medical History:   Diagnosis Date    Chronic back pain     Degenerative disc disease, lumbar     Exercise-induced asthma     Left sided sciatica     Mixed hyperlipidemia     Post-menopausal        Past Surgical History:   Procedure Laterality Date    CARPAL TUNNEL RELEASE Bilateral     TONSILLECTOMY      TUBAL LIGATION           Family History   Problem Relation Age of Onset    Dementia Father         Alzheimer's at 80    Coronary Art Dis Father         CABG at 66's    Cancer Father 80        colon    Breast Cancer Maternal Aunt         Dx 66's       CareTeam (Including outside providers/suppliers regularly involved in providing care):   Patient Care Team:  Jeri Minor MD as PCP - General (Internal Medicine)  Jeri Minor MD as PCP [facility]    Wt Readings from Last 3 Encounters:   08/02/21 158 lb (71.7 kg)   08/02/21 168 lb (76.2 kg)   02/01/21 172 lb (78 kg)     Vitals:    08/02/21 1141   Weight: 158 lb (71.7 kg)   Height: 5' 1\" (1.549 m)     Body mass index is 29.85 kg/m². Based upon direct observation of the patient, evaluation of cognition reveals recent and remote memory intact.     General Appearance: alert and oriented to person, place and time, well developed and well- nourished, in no acute distress  Skin: warm and dry, no rash or erythema  Head: normocephalic and atraumatic  Eyes: pupils equal, round, and reactive to light, extraocular eye movements intact, conjunctivae normal  ENT: tympanic membrane, external ear and ear canal normal bilaterally, nose without deformity, nasal mucosa and turbinates normal without polyps  Neck: supple and non-tender without mass, no thyromegaly or thyroid nodules, no cervical lymphadenopathy  Pulmonary/Chest: clear to auscultation bilaterally- no wheezes, rales or rhonchi, normal air movement, no respiratory distress  Cardiovascular: normal rate, regular rhythm, normal S1 and S2, no murmurs, rubs, clicks, or gallops, distal pulses intact, no carotid bruits  Abdomen: soft, non-tender, non-distended, normal bowel sounds, no masses or organomegaly  Extremities: no cyanosis, clubbing or edema  Musculoskeletal: normal range of motion, no joint swelling, deformity or tenderness  Neurologic: reflexes normal and symmetric, no cranial nerve deficit, gait, coordination and speech normal    Patient's complete Health Risk Assessment and screening values have been reviewed and are found in Flowsheets. The following problems were reviewed today and where indicated follow up appointments were made and/or referrals ordered. Positive Risk Factor Screenings with Interventions:     Fall Risk:  Timed Up and Go Test > 12 seconds? (Complete if either Fall Risk answers are Yes): no  2 or more falls in past year?: no  Fall with injury in past year?: (!) yes (2mo ago, fell onto sidewalk)  Fall Risk Interventions:    · Home safety tips provided       Substance History:  Social History     Tobacco History     Smoking Status  Former Smoker Quit date  1/1/2008 Smoking Frequency  1.5 packs/day for 35 years (52.5 pk yrs)    Smokeless Tobacco Use  Never Used          Alcohol History     Alcohol Use Status  Never          Drug Use     Drug Use Status  Yes Types  Marijuana Frequency   3 times/week Comment  vape          Sexual Activity     Sexually Active  Yes Partners  Male               Alcohol Screening:       A score of 8 or more is associated with harmful or hazardous drinking. A score of 13 or more in women, and 15 or more in men, is likely to indicate alcohol dependence. Substance Abuse Interventions:  · discussed social alcohol use     General Health and ACP:  General  In general, how would you say your health is?: Very Good  In the past 7 days, have you experienced any of the following?  New or Increased Pain, New or Increased Fatigue, Loneliness, Social Isolation, Stress or Anger?: None of These  Do you get the social and emotional support that you need?: Yes  Do you have a Living Will?: Yes  Advance Directives     Power of  Living Will ACP-Advance Directive ACP-Power of     Not on File Not on File Not on File Not on File      General Health Risk Interventions:  ·  is HCPOA primary decision maker    Health Habits/Nutrition:  Health Habits/Nutrition  Do you exercise for at least 20 minutes 2-3 times per week?: Yes  Have you lost any weight without trying in the past 3 months?: No  Do you eat only one meal per day?: (!) Yes (sometimes)  Have you seen the dentist within the past year?: Yes  Body mass index: (!) 29.85  Health Habits/Nutrition Interventions:  · sees dentist every 6 months and eye doctor once every 1-2 years       Personalized Preventive Plan   Current Health Maintenance Status    There is no immunization history on file for this patient. Health Maintenance   Topic Date Due    Hepatitis C screen  Never done    HIV screen  Never done    Cervical cancer screen  Never done    Diabetes screen  Never done    Colon cancer screen colonoscopy  Never done    Low dose CT lung screening  Never done    DEXA (modify frequency per FRAX score)  Never done    Lipid screen  07/11/2019    Pneumococcal 65+ years Vaccine (1 of 1 - PPSV23) Never done   [employer] Visit (AWV)  Never done    DTaP/Tdap/Td vaccine (2 - Td or Tdap) 08/01/2021    Flu vaccine (1) 09/01/2021    Breast cancer screen  08/17/2022    Shingles Vaccine  Completed    COVID-19 Vaccine  Completed    Hepatitis A vaccine  Aged Out    Hepatitis B vaccine  Aged Out    Hib vaccine  Aged Out    Meningococcal (ACWY) vaccine  Aged Out     Recommendations for [employer] Due: see orders and patient instructions/AVS.  . Recommended screening schedule for the next 5-10 years is provided to the patient in written form: see Patient Constantino Bowden was seen today for other.     Diagnoses and all orders for this visit:    Routine general medical examination at a health care facility             Gaby Carpio MD  Internal Medicine  8/2/2021  11:52 AM

## 2021-08-04 ENCOUNTER — TELEPHONE (OUTPATIENT)
Dept: INTERNAL MEDICINE CLINIC | Age: 66
End: 2021-08-04

## 2021-08-04 NOTE — TELEPHONE ENCOUNTER
----- Message from Holly Parnell MD sent at 8/3/2021  1:15 PM EDT -----  Please inform patient that her labs, including cholesterol, are stable. No medication changes needed. Keep trying to work on lowering cholesterol without medications: exercise and diet with low red meat/pork products and low fat.

## 2021-08-06 DIAGNOSIS — Z78.0 POST-MENOPAUSAL: ICD-10-CM

## 2021-08-18 ENCOUNTER — HOSPITAL ENCOUNTER (OUTPATIENT)
Dept: MAMMOGRAPHY | Age: 66
Discharge: HOME | End: 2021-08-18
Payer: MEDICARE

## 2021-08-18 DIAGNOSIS — Z78.0 POST-MENOPAUSAL: ICD-10-CM

## 2021-08-18 DIAGNOSIS — Z12.31 SCREENING MAMMOGRAM, ENCOUNTER FOR: ICD-10-CM

## 2021-08-18 PROCEDURE — 77080 DXA BONE DENSITY AXIAL: CPT

## 2021-08-18 PROCEDURE — 77063 BREAST TOMOSYNTHESIS BI: CPT

## 2021-08-19 ENCOUNTER — TELEPHONE (OUTPATIENT)
Dept: INTERNAL MEDICINE CLINIC | Age: 66
End: 2021-08-19

## 2021-08-19 NOTE — TELEPHONE ENCOUNTER
Please inform patient that her dexa scan is normal. Does not need to start medications for her bone, but she can start calcium/vitamin D supplement OTC if she would like to in order to prevent osteoporosis.

## 2021-09-15 ENCOUNTER — PATIENT MESSAGE (OUTPATIENT)
Dept: INTERNAL MEDICINE CLINIC | Age: 66
End: 2021-09-15

## 2021-09-15 RX ORDER — ALBUTEROL SULFATE 90 UG/1
2 AEROSOL, METERED RESPIRATORY (INHALATION) EVERY 4 HOURS PRN
Qty: 18 G | Refills: 3 | Status: SHIPPED | OUTPATIENT
Start: 2021-09-15 | End: 2022-09-18 | Stop reason: SDUPTHER

## 2021-09-15 NOTE — TELEPHONE ENCOUNTER
From: Hao Cabrera  To: Natasha Abraham MD  Sent: 9/15/2021 9:20 AM EDT  Subject: Prescription Question    Please refill Albuterol Sulfate inhalation aerosol 90 mcg at [facility]. Inhale 2 puffs every 4 hours as needed for wheezing or shortness of breath (or cough).  Thanks Yvrose Sams

## 2021-12-09 ENCOUNTER — PATIENT MESSAGE (OUTPATIENT)
Dept: INTERNAL MEDICINE CLINIC | Age: 66
End: 2021-12-09

## 2021-12-09 NOTE — TELEPHONE ENCOUNTER
From: Nona Coughlin  To: Dr. Yisel Cervantes  Sent: 12/9/2021 2:46 PM EST  Subject: Josesito Molina,  I have been using Tretinoin 0.025mg daily to EOD for about 20 years now. It does not seem like it works as well as it used to. Do you think it would be safe to try the 0.05mg strength? If you agree can you request the 45 gm tube of cream as the GoodRx coupon will give me the best value [facility]) . Thank you for your consideration in this matter.   Chanel Pack

## 2021-12-10 RX ORDER — TRETINOIN 0.5 MG/G
CREAM TOPICAL
Qty: 45 G | Refills: 1 | Status: SHIPPED | OUTPATIENT
Start: 2021-12-10 | End: 2022-01-09

## 2022-01-10 ENCOUNTER — TELEPHONE (OUTPATIENT)
Dept: INTERNAL MEDICINE CLINIC | Age: 67
End: 2022-01-10

## 2022-06-17 ENCOUNTER — OFFICE VISIT (OUTPATIENT)
Dept: INTERNAL MEDICINE CLINIC | Age: 67
End: 2022-06-17
Payer: MEDICARE

## 2022-06-17 VITALS
RESPIRATION RATE: 20 BRPM | OXYGEN SATURATION: 99 % | WEIGHT: 171 LBS | DIASTOLIC BLOOD PRESSURE: 90 MMHG | BODY MASS INDEX: 32.28 KG/M2 | SYSTOLIC BLOOD PRESSURE: 150 MMHG | HEIGHT: 61 IN | HEART RATE: 83 BPM

## 2022-06-17 DIAGNOSIS — E78.5 DYSLIPIDEMIA: ICD-10-CM

## 2022-06-17 DIAGNOSIS — R09.82 POST-NASAL DRIP: ICD-10-CM

## 2022-06-17 DIAGNOSIS — M54.32 LEFT SIDED SCIATICA: Primary | ICD-10-CM

## 2022-06-17 PROCEDURE — 1123F ACP DISCUSS/DSCN MKR DOCD: CPT | Performed by: INTERNAL MEDICINE

## 2022-06-17 PROCEDURE — 99213 OFFICE O/P EST LOW 20 MIN: CPT | Performed by: INTERNAL MEDICINE

## 2022-06-17 RX ORDER — LORATADINE 10 MG/1
10 TABLET ORAL DAILY
COMMUNITY

## 2022-06-17 RX ORDER — PREDNISONE 20 MG/1
20 TABLET ORAL DAILY
Qty: 5 TABLET | Refills: 0 | Status: SHIPPED | OUTPATIENT
Start: 2022-06-17 | End: 2022-06-22

## 2022-06-17 NOTE — PROGRESS NOTES
maintenance:   Health Maintenance Due   Topic Date Due    Low dose CT lung screening  Never done    DTaP/Tdap/Td vaccine (2 - Td or Tdap) 08/01/2021    COVID-19 Vaccine (3 - Booster for Moderna series) 09/01/2021         Review of Systems:  Constitutional: no fevers, no chills, no night sweats, no weight loss, no weight gain, no fatigue   Pain assessment: chronic LBP and L sciatica   Head: no headaches  Ears: no hearing loss, no tinnitus, no vertigo  Eyes: no blurry vision, no diplopia, no dryness, no itchiness  Mouth: no oral ulcers, no dry mouth, no sore throat  Nose: no nasal congestion, no epistaxis  Cardiac: no chest pain, no palpitations, no leg swelling, no orthopnea, no PND, no syncope  Pulmonary: no dyspnea, no cough, no wheezing, no hemoptysis  GI: no nausea, no vomiting, no diarrhea, no constipation, no abdominal pain, no hematochezia  : no dysuria, no frequency, no urgency, no hematuria, no frothy urine, no dyspareunia, no pelvic pain, no vaginal bleeding, no abnormal vaginal discharge  MSK: no arthralgias, no myalgias, no early morning stiffness, Raynaud's, numbness L > R toes intermittently   Neuro: no focal neurological deficits, no seizures  Sleep: no snoring, no daytime somnolence   Psych: no depression, no anxiety, no suicidal ideation      Physical Exam:  VITALS:   BP (!) 150/90 (Site: Left Upper Arm, Position: Sitting, Cuff Size: Large Adult)   Pulse 83   Resp 20   Ht 5' 1\" (1.549 m)   Wt 171 lb (77.6 kg)   SpO2 99%   BMI 32.31 kg/m²     PHYSICAL EXAMINATION:  General: alert, awake, and oriented to time, place, person, and situation. Not in acute distress   Skin:  no suspicious rashes, no jaundice  Head: normocephalic/atraumatic  Eyes: anicteric sclera, well-injected conjunctiva. Pupils are equally round and reactive to light.  Extraocular movements are intact   Nose: no septal deviation evident  Sinuses: no sinus tenderness  Ears: external ears normal  Neck: supple, no cervical lymphadenopathy, thyroid symmetric and not enlarged, no bruits   Heart: regular rate and rhythm, regular S1/S2, no S3/S4, no audible murmurs, no audible friction rub  Lungs: clear to auscultation bilaterally, no audible crackles, no audible wheezes, no audible rhonchi    Abdomen: normal bowel sounds, soft abdomen, non-tender, no palpable masses  Extremities: no edema, warm, no cyanosis, no clubbing. Good capillary refill   MSK: no tenderness across spinous processes, full ROM in all 4 extremities. No joint swelling or tenderness   Peripheral vascular: 2+ pulses symmetric (radial)  Neuro: gait normal, CN II-XII intact, motor power 5/5 in all 4 extremities, sensation intact and symmetric    Labs   I have personally reviewed labs, and discussed pertinent findings with patient on this date 6/17/2022     Imaging   I have personally reviewed imaging, and discussed pertinent findings with patient on this date 6/17/2022     Other notes  I have personally reviewed other notes, and discussed pertinent findings with patient on this date 6/17/2022       Assessment/Plan:     1. Left sided sciatica  Prednisone for 'acute on chronic pain per patient preference. Lidocaine patch OTC, heating pad, stretching, topicals OTC   - predniSONE (DELTASONE) 20 MG tablet; Take 1 tablet by mouth daily for 5 days  Dispense: 5 tablet; Refill: 0    2. Dyslipidemia  LDL 128, TG 65 on 8/2/2021  Heart healthy diet   Physical in Aug 2022 and will order labs then     3. Post-nasal drip  Flonase  Anti-histamine  Albuterol inh PRN         Care discussed with patient and questions answered. Patient verbalizes understanding and agrees with plan. Discussed with patient the importance of continuity of care. I encouraged patient to schedule next appointment within 6 months (already scheduled for physical) with me. Patient prefers to be reached by Phone call at [phone] for future medical correspondence. Encouraged to activate [employer].     I reviewed and reconciled the medications this visit. I reviewed and updated the past medical, surgical, social, and family history during this visit. After visit summary provided.        Gaby Carpio MD  Internal Medicine  6/17/2022   4:28 PM

## 2022-06-17 NOTE — PROGRESS NOTES
Patient having low back pain radiating down her leg. She has some numbness in her toes which she states has been like that for about a year .

## 2022-07-07 ENCOUNTER — PATIENT MESSAGE (OUTPATIENT)
Dept: INTERNAL MEDICINE CLINIC | Age: 67
End: 2022-07-07

## 2022-07-07 NOTE — TELEPHONE ENCOUNTER
From: Rosario Rubalcava  To: Dr. Tr Ro  Sent: 7/7/2022 12:37 PM EDT  Subject: WIP needs your Marianne Peters,  Today I checked on the status of my prescription your office faxed to [facility]. They said that they faxed you a form twice needing your signature to give me the refills , or that you could call the pharmacist with the okay. Could you please call or fax as I am running low.  [phone] (central time) main line  Thanks  Cathy Bangura

## 2022-08-10 ENCOUNTER — OFFICE VISIT (OUTPATIENT)
Dept: INTERNAL MEDICINE CLINIC | Age: 67
End: 2022-08-10
Payer: MEDICARE

## 2022-08-10 VITALS
OXYGEN SATURATION: 100 % | RESPIRATION RATE: 18 BRPM | WEIGHT: 168 LBS | HEART RATE: 72 BPM | DIASTOLIC BLOOD PRESSURE: 80 MMHG | BODY MASS INDEX: 31.72 KG/M2 | SYSTOLIC BLOOD PRESSURE: 134 MMHG | HEIGHT: 61 IN

## 2022-08-10 DIAGNOSIS — Z00.00 ROUTINE GENERAL MEDICAL EXAMINATION AT A HEALTH CARE FACILITY: Primary | ICD-10-CM

## 2022-08-10 PROCEDURE — 99397 PER PM REEVAL EST PAT 65+ YR: CPT | Performed by: INTERNAL MEDICINE

## 2022-08-10 RX ORDER — FEXOFENADINE HCL 180 MG/1
TABLET ORAL
COMMUNITY
Start: 2022-06-01

## 2022-08-10 SDOH — ECONOMIC STABILITY: FOOD INSECURITY: WITHIN THE PAST 12 MONTHS, YOU WORRIED THAT YOUR FOOD WOULD RUN OUT BEFORE YOU GOT MONEY TO BUY MORE.: NEVER TRUE

## 2022-08-10 SDOH — ECONOMIC STABILITY: FOOD INSECURITY: WITHIN THE PAST 12 MONTHS, THE FOOD YOU BOUGHT JUST DIDN'T LAST AND YOU DIDN'T HAVE MONEY TO GET MORE.: NEVER TRUE

## 2022-08-10 ASSESSMENT — PATIENT HEALTH QUESTIONNAIRE - PHQ9
1. LITTLE INTEREST OR PLEASURE IN DOING THINGS: 1
SUM OF ALL RESPONSES TO PHQ9 QUESTIONS 1 & 2: 2
2. FEELING DOWN, DEPRESSED OR HOPELESS: 1
SUM OF ALL RESPONSES TO PHQ QUESTIONS 1-9: 2

## 2022-08-10 ASSESSMENT — SOCIAL DETERMINANTS OF HEALTH (SDOH): HOW HARD IS IT FOR YOU TO PAY FOR THE VERY BASICS LIKE FOOD, HOUSING, MEDICAL CARE, AND HEATING?: NOT VERY HARD

## 2022-08-10 NOTE — PROGRESS NOTES
8/10/22    Pedro Luis Wylie  1955    Chief Complaint   Patient presents with    1 Year Follow Up       History of Present Illness:  Pedro Luis Wylie is a 78 y.o. pleasant lady presenting today with a chief complaint of physical.  She has a past medical history significant for:  HL (, TG 65 on 8/2/2021), not on statin   Degenerative disc disease lumbar and L sciatica, on Ibuprofen PRN  Post-menopausal, on hormonal therapy  Exercise-induced asthma (?emphysema), on Albuterol inh PRN   GERD, on Pepcid 20mg QD PRN   Diverticulosis   Hemorrhoids   S/p tubal ligation  S/p bilateral carpal tunnel release  S/p tonsillectomy  Former smoker (quit 2008)  Medical marijuana      # Patient with sciatica and DDD lumbar. Intermittent. Better once she retired. She used to be a . Mostly L sciatica is bothering her. Takes Ibuprofen PRN. Did not tolerate Gabapentin. Pain kept her up at night. She had XR lumbar done 2018. Also having numbness in L > R toes intermittently. No saddle anesthesia or weakness in lower extremities. No incontinence. S/p PT. She is using medical marijuana. # Normal DEXA 08/2021. # UTD on pap smears: pap and HPV done 2019 [facility]). # Colonoscopy Jan 2022. 1 adenomatous polyp. FHx colon cancer. Due in 3 years. # Per patient she was having severe post-menopausal symptoms that her PCP had to place her on hormonal therapy. Was not seeing Gyn for this. She is on Estrogen cream and vaginal supp, as well as progesterone cream.  However due to finances patient is wanting to switch to estrogen/progesterone cream.    # Has exercise-induced asthma. Uses Albuterol inh PRN. Has bronchospasms when she goes to walk in the cold. Albuterol helps. # She quit smoking in 2008. Recently quit nicorette gum.  Went to hypnotist.  # Reports her prior PCP said she no longer needs LDCT due to adequate follow up in the past?      Health maintenance:   Health Maintenance Due Topic Date Due    Low dose CT lung screening  Never done    DTaP/Tdap/Td vaccine (2 - Td or Tdap) 08/01/2021    COVID-19 Vaccine (3 - Booster for Moderna series) 09/01/2021    Pneumococcal 65+ years Vaccine (2 - PCV) 08/02/2022    Depression Screen  08/02/2022    Annual Wellness Visit (AWV)  08/03/2022         Review of Systems:  Constitutional: no fevers, no chills, no night sweats, no weight loss, no weight gain, no fatigue  Pain assessment: chronic LBP and L sciatica   Head: no headaches  Ears: no hearing loss, no tinnitus, no vertigo  Eyes: no blurry vision, no diplopia, no dryness, no itchiness  Mouth: no oral ulcers, no dry mouth, no sore throat  Nose: no nasal congestion, no epistaxis  Cardiac: no chest pain, no palpitations, no leg swelling, no orthopnea, no PND, no syncope  Pulmonary: no dyspnea, no cough, no wheezing, no hemoptysis  GI: no nausea, no vomiting, no diarrhea, no constipation, no abdominal pain, no hematochezia  : no dysuria, no frequency, no urgency, no hematuria, no frothy urine, no dyspareunia, no pelvic pain, no vaginal bleeding, no abnormal vaginal discharge  MSK: Raynaud's, numbness L > R toes intermittently   Neuro: no focal neurological deficits, no seizures  Sleep: no snoring, no daytime somnolence  Psych: no depression, no anxiety, no suicidal ideation      Physical Exam:  VITALS:   /80 (Site: Right Upper Arm)   Pulse 72   Resp 18   Ht 5' 1\" (1.549 m)   Wt 168 lb (76.2 kg)   SpO2 100%   BMI 31.74 kg/m²     PHYSICAL EXAMINATION:  General: alert, awake, and oriented to time, place, person, and situation. Not in acute distress   Skin:  no suspicious rashes, no jaundice  Head: normocephalic/atraumatic  Eyes: anicteric sclera, well-injected conjunctiva. Pupils are equally round and reactive to light.  Extraocular movements are intact   Nose: no septal deviation evident  Sinuses: no sinus tenderness  Ears: external ears normal  Neck: supple, no cervical lymphadenopathy, thyroid symmetric and not enlarged, no bruits   Heart: regular rate and rhythm, regular S1/S2, no S3/S4, no audible murmurs, no audible friction rub  Lungs: clear to auscultation bilaterally, no audible crackles, no audible wheezes, no audible rhonchi    Abdomen: normal bowel sounds, soft abdomen, non-tender, no palpable masses  Extremities: no edema, warm, no cyanosis, no clubbing. Good capillary refill   MSK: no tenderness across spinous processes, full ROM in all 4 extremities. No joint swelling or tenderness   Peripheral vascular: 2+ pulses symmetric (radial)  Neuro: gait normal, CN II-XII intact, motor power 5/5 in all 4 extremities, sensation intact and symmetric    Labs   I have personally reviewed labs, and discussed pertinent findings with patient on this date 8/10/2022     Imaging   I have personally reviewed imaging, and discussed pertinent findings with patient on this date 8/10/2022     Other notes  I have personally reviewed other notes, and discussed pertinent findings with patient on this date 8/10/2022       Assessment/Plan:     1. Routine general medical examination at a health care facility  - CBC with Auto Differential; Future  - Comprehensive Metabolic Panel; Future  - Lipid, Fasting; Future      Care discussed with patient and questions answered. Patient verbalizes understanding and agrees with plan. Discussed with patient the importance of continuity of care. I encouraged patient to schedule next appointment within 1 year with me. Patient prefers to be reached by Phone call at [phone] for future medical correspondence. Encouraged to activate MyChart. I reviewed and reconciled the medications this visit. I reviewed and updated the past medical, surgical, social, and family history during this visit. After visit summary provided.        Stephania Galvan MD  Internal Medicine  8/10/2022   2:44 PM

## 2022-08-10 NOTE — PATIENT INSTRUCTIONS
Fasting for a blood test: taking the right steps before testing helps ensure your results will be accurate. Why do I need to fast before my blood test?  If your healthcare provider has told you to fast before a blood test, it means you should not eat or drink anything, except water, for several hours before your test. When you eat and drink normally, those foods and beverages are absorbed into your bloodstream. That could affect the results of certain types of blood tests. What types of blood tests require fasting? The most common tests that require fasting are:  Glucose tests, which measure your blood sugar. Lipid tests, which measure cholesterol and triglycerides. You do not need to be fasting for HbA1C test.     How long do I have to fast before the test?  You usually need to fast for 8-12 hours before the test. Most tests that require fasting are scheduled for early in the morning. That way, most of your fasting time will be overnight. Can I drink anything besides water during a fast?  No. Juice, coffee, soda, and other beverages can get in your bloodstream and affect your results. In addition, you should not:  Chew gum   Smoke   Exercise  These activities can also affect your results. But you can drink water. It's actually encouraged that you drink 2 glasses of water before any blood test. It helps keep more fluid in your veins, which can make it easier to draw blood. If you are dehydrated, your blood draw experience may be unpleasant. Can I continue taking medicine during a fast?  Most of the time it's OK to take your usual medicines with water, unless otherwise specified by your healthcare provider. You may need to avoid certain medicines that you normally take with food.      What if I make a mistake and have something to eat or drink besides water during my fast?  Tell your healthcare provider before your test. Your test will most likely have to be re-scheduled for another time when you

## 2022-08-16 ENCOUNTER — TELEMEDICINE (OUTPATIENT)
Dept: INTERNAL MEDICINE CLINIC | Age: 67
End: 2022-08-16
Payer: MEDICARE

## 2022-08-16 DIAGNOSIS — H01.133 ECZEMA OF RIGHT EYELID: Primary | ICD-10-CM

## 2022-08-16 DIAGNOSIS — H01.136 ECZEMA OF LEFT EYELID: ICD-10-CM

## 2022-08-16 DIAGNOSIS — H10.9 BACTERIAL CONJUNCTIVITIS OF BOTH EYES: ICD-10-CM

## 2022-08-16 DIAGNOSIS — B96.89 BACTERIAL CONJUNCTIVITIS OF BOTH EYES: ICD-10-CM

## 2022-08-16 PROCEDURE — 1123F ACP DISCUSS/DSCN MKR DOCD: CPT | Performed by: INTERNAL MEDICINE

## 2022-08-16 PROCEDURE — 99214 OFFICE O/P EST MOD 30 MIN: CPT | Performed by: INTERNAL MEDICINE

## 2022-08-16 RX ORDER — POLYMYXIN B SULFATE AND TRIMETHOPRIM 1; 10000 MG/ML; [USP'U]/ML
1 SOLUTION OPHTHALMIC EVERY 4 HOURS
Qty: 30 ML | Refills: 1 | Status: SHIPPED | OUTPATIENT
Start: 2022-08-16 | End: 2022-08-26

## 2022-08-16 RX ORDER — PREDNISONE 10 MG/1
TABLET ORAL
Qty: 27 TABLET | Refills: 0 | Status: SHIPPED | OUTPATIENT
Start: 2022-08-16

## 2022-08-16 NOTE — PROGRESS NOTES
TELEHEALTH EVALUATION -- Audio/Visual (During EVGQX-15 public health emergency)      Zohra Dumont  1955    Patient location: Patient Home     Zohra Dumont is a 78 y.o. pleasant lady evaluated via video on 8/16/22       Consent:  She and/or health care decision maker is aware that that she may receive a bill for this virtual visit service, depending on her insurance coverage, and has provided verbal consent to proceed: Yes      History of Present Illness:  Zohra Dumont is a 78 y.o. pleasant lady who has requested an audio/video evaluation for the following concern(s): eczema and conjunctivitis. She has a past medical history significant for:  HL (, TG 65 on 8/2/2021), not on statin   Degenerative disc disease lumbar and L sciatica, on Ibuprofen PRN  Post-menopausal, on hormonal therapy  Exercise-induced asthma (?emphysema), on Albuterol inh PRN   GERD, on Pepcid 20mg QD PRN   Diverticulosis   Hemorrhoids   S/p tubal ligation  S/p bilateral carpal tunnel release  S/p tonsillectomy  Former smoker (quit 2008)  Medical marijuana     # Patient reports redness in her top eyelids >> bottom eyelids over the past week. No pain on EOM. No swelling or pain. No new exposures such as new creams or soaps. No fever. No blurred vision. Endorses itching from her eyelids but not deeper. She does endorse green crusting in her eyes when she wakes up. She has been applying lubricating eyedrops.        Health maintenance:   Health Maintenance Due   Topic Date Due    Low dose CT lung screening  Never done    DTaP/Tdap/Td vaccine (2 - Td or Tdap) 08/01/2021    COVID-19 Vaccine (3 - Booster for Moderna series) 09/01/2021    Pneumococcal 65+ years Vaccine (2 - PCV) 08/02/2022    Annual Wellness Visit (AWV)  08/03/2022         Review of Systems:  Constitutional: crusting with green discharge bilateral eyes, erythema bilateral eyelids (top >> bottom)      Physical Exam:  Due to this being a TeleHealth encounter, evaluation of the following organ systems is limited: Vitals/Constitutional/EENT/Resp/CV/GI//MSK/Neuro/Skin/Heme-Lymph-Imm. General: alert, awake, and oriented to time, place, person, and situation. Not in acute distress. Not toxic appearing. Mood appears normal   Skin: no jaundice, no rash on visible skin. Difficult to examine skin on eyelids  Head: appears grossly normocephalic/atraumatic  Eyes: anicteric sclera, extraocular movements are intact   Oropharynx: lips are not cyanotic  Lungs: breathing appears normal, does not appear tachypneic, does not appear labored  Abdomen: abdomen does not appear to be distended  Extremities: no swelling noted in bilateral lower extremities  MSK: full ROM in all 4 extremities. No joint swelling or erythema noted   Neuro: gait normal, CN II-XII grossly intact, motor power grossly intact in all 4 extremities      Labs   I have personally reviewed labs, and discussed pertinent findings with patient on this date 8/16/2022     Imaging   I have personally reviewed imaging, and discussed pertinent findings with patient on this date 8/16/2022     Other notes  I have personally reviewed other notes, and discussed pertinent findings with patient on this date 8/16/2022       Assessment/Plan:     1. Eczema of right eyelid  - predniSONE (DELTASONE) 10 MG tablet; Take 40mg for 5 days followed by 30mg x1 day, 20mg x1 day, 10mg x1 day, 5mg x1 day then stop  Dispense: 27 tablet; Refill: 0    2. Eczema of left eyelid  - predniSONE (DELTASONE) 10 MG tablet; Take 40mg for 5 days followed by 30mg x1 day, 20mg x1 day, 10mg x1 day, 5mg x1 day then stop  Dispense: 27 tablet; Refill: 0    3. Bacterial conjunctivitis of both eyes  - trimethoprim-polymyxin b (POLYTRIM) 56161-0.1 UNIT/ML-% ophthalmic solution; Place 1 drop into both eyes every 4 hours for 10 days  Dispense: 30 mL; Refill: 1      Care discussed with patient and questions answered. Patient verbalizes understanding and agrees with plan. Discussed with patient the importance of continuity of care. I encouraged patient to schedule next appointment within 1 year with me. I reviewed and reconciled the medications this visit. I reviewed and updated the past medical, surgical, social, and family history during this visit. Pursuant to the emergency declaration under the [address] [place] [address], [facility] waiver authority and the [employer] Act, this Virtual Visit was conducted, with patient's consent, to reduce the patient's risk of exposure to COVID-19 and provide continuity of care for an established patient. Services were provided through a video synchronous discussion virtually to substitute for in-person clinic visit.       Job Castillo MD  Internal Medicine  8/16/2022   11:20 AM

## 2022-09-01 ENCOUNTER — HOSPITAL ENCOUNTER (OUTPATIENT)
Age: 67
Discharge: HOME | End: 2022-09-01
Payer: MEDICARE

## 2022-09-01 ENCOUNTER — HOSPITAL ENCOUNTER (OUTPATIENT)
Dept: MAMMOGRAPHY | Age: 67
Discharge: HOME | End: 2022-09-01
Payer: MEDICARE

## 2022-09-01 DIAGNOSIS — Z12.31 SCREENING MAMMOGRAM, ENCOUNTER FOR: ICD-10-CM

## 2022-09-01 LAB
ALBUMIN SERPL-MCNC: 4.4 GM/DL (ref 3.4–5)
ALP BLD-CCNC: 88 IU/L (ref 40–129)
ALT SERPL-CCNC: 13 U/L (ref 10–40)
ANION GAP SERPL CALCULATED.3IONS-SCNC: 11 MMOL/L (ref 4–16)
AST SERPL-CCNC: 20 IU/L (ref 15–37)
BASOPHILS ABSOLUTE: 0.1 K/CU MM
BASOPHILS RELATIVE PERCENT: 0.8 % (ref 0–1)
BILIRUB SERPL-MCNC: 0.5 MG/DL (ref 0–1)
BUN BLDV-MCNC: 16 MG/DL (ref 6–23)
CALCIUM SERPL-MCNC: 9.4 MG/DL (ref 8.3–10.6)
CHLORIDE BLD-SCNC: 101 MMOL/L (ref 99–110)
CHOLESTEROL, FASTING: 251 MG/DL
CO2: 26 MMOL/L (ref 21–32)
CREAT SERPL-MCNC: 0.9 MG/DL (ref 0.6–1.1)
DIFFERENTIAL TYPE: ABNORMAL
EOSINOPHILS ABSOLUTE: 0.1 K/CU MM
EOSINOPHILS RELATIVE PERCENT: 1.6 % (ref 0–3)
GFR AFRICAN AMERICAN: >60 ML/MIN/1.73M2
GFR NON-AFRICAN AMERICAN: >60 ML/MIN/1.73M2
GLUCOSE FASTING: 94 MG/DL (ref 70–99)
HCT VFR BLD CALC: 45.1 % (ref 37–47)
HDLC SERPL-MCNC: 82 MG/DL
HEMOGLOBIN: 14.6 GM/DL (ref 12.5–16)
IMMATURE NEUTROPHIL %: 0.2 % (ref 0–0.43)
LDL CHOLESTEROL CALCULATED: 154 MG/DL
LYMPHOCYTES ABSOLUTE: 1.4 K/CU MM
LYMPHOCYTES RELATIVE PERCENT: 21.4 % (ref 24–44)
MCH RBC QN AUTO: 30.8 PG (ref 27–31)
MCHC RBC AUTO-ENTMCNC: 32.4 % (ref 32–36)
MCV RBC AUTO: 95.1 FL (ref 78–100)
MONOCYTES ABSOLUTE: 0.6 K/CU MM
MONOCYTES RELATIVE PERCENT: 9.8 % (ref 0–4)
PDW BLD-RTO: 14.3 % (ref 11.7–14.9)
PLATELET # BLD: 256 K/CU MM (ref 140–440)
PMV BLD AUTO: 9.9 FL (ref 7.5–11.1)
POTASSIUM SERPL-SCNC: 4.2 MMOL/L (ref 3.5–5.1)
RBC # BLD: 4.74 M/CU MM (ref 4.2–5.4)
SEGMENTED NEUTROPHILS ABSOLUTE COUNT: 4.2 K/CU MM
SEGMENTED NEUTROPHILS RELATIVE PERCENT: 66.2 % (ref 36–66)
SODIUM BLD-SCNC: 138 MMOL/L (ref 135–145)
TOTAL IMMATURE NEUTOROPHIL: 0.01 K/CU MM
TOTAL PROTEIN: 7.5 GM/DL (ref 6.4–8.2)
TRIGLYCERIDE, FASTING: 76 MG/DL
WBC # BLD: 6.3 K/CU MM (ref 4–10.5)

## 2022-09-01 PROCEDURE — 80053 COMPREHEN METABOLIC PANEL: CPT

## 2022-09-01 PROCEDURE — 85025 COMPLETE CBC W/AUTO DIFF WBC: CPT

## 2022-09-01 PROCEDURE — 77063 BREAST TOMOSYNTHESIS BI: CPT

## 2022-09-01 PROCEDURE — 80061 LIPID PANEL: CPT

## 2022-09-01 PROCEDURE — 36415 COLL VENOUS BLD VENIPUNCTURE: CPT

## 2022-09-19 RX ORDER — ALBUTEROL SULFATE 90 UG/1
2 AEROSOL, METERED RESPIRATORY (INHALATION) EVERY 4 HOURS PRN
Qty: 18 G | Refills: 3 | Status: SHIPPED | OUTPATIENT
Start: 2022-09-19

## 2022-10-13 ENCOUNTER — TELEPHONE (OUTPATIENT)
Dept: INTERNAL MEDICINE CLINIC | Age: 67
End: 2022-10-13

## 2022-10-13 NOTE — TELEPHONE ENCOUNTER
Patient stopped by the office to inform us that she has received a letter from her insurance carrier, [employer] stating that her receipt labs that were drawn on 9/1/22 at [facility] are not covered by her insurance. Patient was instructed to contact her insurance carrier to verify regarding the matter and verify if she is required to use a different lab facility for all blood work. Patient expressed verbal understanding. I informed patient that I would notify MA and PCP of issue and have MA follow up with her on findings involving non-coverage.

## 2022-12-26 ENCOUNTER — PATIENT MESSAGE (OUTPATIENT)
Dept: INTERNAL MEDICINE CLINIC | Age: 67
End: 2022-12-26

## 2022-12-27 RX ORDER — KETOCONAZOLE 20 MG/ML
SHAMPOO TOPICAL
Qty: 30 EACH | Refills: 3 | Status: SHIPPED | OUTPATIENT
Start: 2022-12-27

## 2022-12-27 NOTE — TELEPHONE ENCOUNTER
From: Karen Garduno  To: Dr. Cheko Zhang: 12/26/2022 9:39 AM EST  Subject: Refill Ketoconazole Shampoo    I am requesting a refill of Ketoconazole 2% Shampoo at [address],  [place]

## 2023-03-11 ENCOUNTER — PATIENT MESSAGE (OUTPATIENT)
Dept: INTERNAL MEDICINE CLINIC | Age: 68
End: 2023-03-11

## 2023-03-13 NOTE — TELEPHONE ENCOUNTER
From: Batsheva Zuñiga  To: Dr. Cristino Joy  Sent: 3/11/2023 9:35 AM EST  Subject: Refill request     Dr. Tawanda Fierro,  I am requesting a refill for Tretinoin 0.05% cream, 45gm. Apply topically nightly to affected area.      Please send to [facility]     Thank you for your consideration,  Trang Campo

## 2023-05-05 ENCOUNTER — TELEPHONE (OUTPATIENT)
Dept: INTERNAL MEDICINE CLINIC | Age: 68
End: 2023-05-05

## 2023-08-07 SDOH — ECONOMIC STABILITY: HOUSING INSECURITY
IN THE LAST 12 MONTHS, WAS THERE A TIME WHEN YOU DID NOT HAVE A STEADY PLACE TO SLEEP OR SLEPT IN A SHELTER (INCLUDING NOW)?: NO

## 2023-08-07 SDOH — ECONOMIC STABILITY: INCOME INSECURITY: HOW HARD IS IT FOR YOU TO PAY FOR THE VERY BASICS LIKE FOOD, HOUSING, MEDICAL CARE, AND HEATING?: NOT HARD AT ALL

## 2023-08-07 SDOH — ECONOMIC STABILITY: FOOD INSECURITY: WITHIN THE PAST 12 MONTHS, YOU WORRIED THAT YOUR FOOD WOULD RUN OUT BEFORE YOU GOT MONEY TO BUY MORE.: NEVER TRUE

## 2023-08-07 SDOH — ECONOMIC STABILITY: FOOD INSECURITY: WITHIN THE PAST 12 MONTHS, THE FOOD YOU BOUGHT JUST DIDN'T LAST AND YOU DIDN'T HAVE MONEY TO GET MORE.: NEVER TRUE

## 2023-08-07 SDOH — ECONOMIC STABILITY: TRANSPORTATION INSECURITY
IN THE PAST 12 MONTHS, HAS LACK OF TRANSPORTATION KEPT YOU FROM MEETINGS, WORK, OR FROM GETTING THINGS NEEDED FOR DAILY LIVING?: NO

## 2023-08-07 ASSESSMENT — PATIENT HEALTH QUESTIONNAIRE - PHQ9
2. FEELING DOWN, DEPRESSED OR HOPELESS: 0
1. LITTLE INTEREST OR PLEASURE IN DOING THINGS: 0
2. FEELING DOWN, DEPRESSED OR HOPELESS: NOT AT ALL
SUM OF ALL RESPONSES TO PHQ9 QUESTIONS 1 & 2: 0
SUM OF ALL RESPONSES TO PHQ QUESTIONS 1-9: 0
SUM OF ALL RESPONSES TO PHQ9 QUESTIONS 1 & 2: 0
1. LITTLE INTEREST OR PLEASURE IN DOING THINGS: NOT AT ALL
SUM OF ALL RESPONSES TO PHQ QUESTIONS 1-9: 0

## 2023-08-10 ENCOUNTER — OFFICE VISIT (OUTPATIENT)
Dept: INTERNAL MEDICINE CLINIC | Age: 68
End: 2023-08-10
Payer: MEDICARE

## 2023-08-10 VITALS
RESPIRATION RATE: 18 BRPM | HEART RATE: 66 BPM | SYSTOLIC BLOOD PRESSURE: 134 MMHG | BODY MASS INDEX: 31.72 KG/M2 | DIASTOLIC BLOOD PRESSURE: 80 MMHG | HEIGHT: 61 IN | WEIGHT: 168 LBS

## 2023-08-10 VITALS
DIASTOLIC BLOOD PRESSURE: 80 MMHG | BODY MASS INDEX: 31.83 KG/M2 | HEART RATE: 66 BPM | HEIGHT: 61 IN | RESPIRATION RATE: 18 BRPM | WEIGHT: 168.6 LBS | SYSTOLIC BLOOD PRESSURE: 136 MMHG

## 2023-08-10 DIAGNOSIS — Z11.59 NEED FOR HEPATITIS C SCREENING TEST: ICD-10-CM

## 2023-08-10 DIAGNOSIS — Z00.00 INITIAL MEDICARE ANNUAL WELLNESS VISIT: Primary | ICD-10-CM

## 2023-08-10 DIAGNOSIS — J45.990 EXERCISE-INDUCED ASTHMA: ICD-10-CM

## 2023-08-10 DIAGNOSIS — M51.36 LUMBAR DEGENERATIVE DISC DISEASE: ICD-10-CM

## 2023-08-10 DIAGNOSIS — E66.9 OBESITY (BMI 30.0-34.9): ICD-10-CM

## 2023-08-10 DIAGNOSIS — E78.5 DYSLIPIDEMIA: ICD-10-CM

## 2023-08-10 DIAGNOSIS — Z23 NEED FOR TDAP VACCINATION: ICD-10-CM

## 2023-08-10 DIAGNOSIS — M76.30 ILIOTIBIAL BAND SYNDROME, UNSPECIFIED LATERALITY: Primary | ICD-10-CM

## 2023-08-10 DIAGNOSIS — H61.21 IMPACTED CERUMEN OF RIGHT EAR: ICD-10-CM

## 2023-08-10 PROBLEM — M54.32 LEFT SIDED SCIATICA: Status: RESOLVED | Noted: 2021-02-01 | Resolved: 2023-08-10

## 2023-08-10 PROCEDURE — 69209 REMOVE IMPACTED EAR WAX UNI: CPT | Performed by: GENERAL PRACTICE

## 2023-08-10 PROCEDURE — 90715 TDAP VACCINE 7 YRS/> IM: CPT | Performed by: GENERAL PRACTICE

## 2023-08-10 PROCEDURE — G0438 PPPS, INITIAL VISIT: HCPCS | Performed by: GENERAL PRACTICE

## 2023-08-10 PROCEDURE — 90471 IMMUNIZATION ADMIN: CPT | Performed by: GENERAL PRACTICE

## 2023-08-10 PROCEDURE — 99213 OFFICE O/P EST LOW 20 MIN: CPT | Performed by: GENERAL PRACTICE

## 2023-08-10 PROCEDURE — 1123F ACP DISCUSS/DSCN MKR DOCD: CPT | Performed by: GENERAL PRACTICE

## 2023-08-10 RX ORDER — MECOBALAMIN 5000 MCG
15 TABLET,DISINTEGRATING ORAL DAILY
COMMUNITY
End: 2023-08-10

## 2023-08-10 RX ORDER — TRETINOIN 0.5 MG/G
CREAM TOPICAL
COMMUNITY
Start: 2012-03-11

## 2023-08-10 ASSESSMENT — LIFESTYLE VARIABLES
HOW MANY STANDARD DRINKS CONTAINING ALCOHOL DO YOU HAVE ON A TYPICAL DAY: 3 OR 4
HOW OFTEN DO YOU HAVE A DRINK CONTAINING ALCOHOL: NEVER

## 2023-08-10 ASSESSMENT — PATIENT HEALTH QUESTIONNAIRE - PHQ9
SUM OF ALL RESPONSES TO PHQ9 QUESTIONS 1 & 2: 0
SUM OF ALL RESPONSES TO PHQ QUESTIONS 1-9: 0
2. FEELING DOWN, DEPRESSED OR HOPELESS: 0
SUM OF ALL RESPONSES TO PHQ QUESTIONS 1-9: 0
SUM OF ALL RESPONSES TO PHQ QUESTIONS 1-9: 0
1. LITTLE INTEREST OR PLEASURE IN DOING THINGS: 0
SUM OF ALL RESPONSES TO PHQ QUESTIONS 1-9: 0

## 2023-08-10 ASSESSMENT — ENCOUNTER SYMPTOMS
NAUSEA: 0
BACK PAIN: 0
STRIDOR: 0
CHEST TIGHTNESS: 0
VOMITING: 0
SHORTNESS OF BREATH: 0
COUGH: 0
BLOOD IN STOOL: 0
ABDOMINAL PAIN: 0

## 2023-08-10 NOTE — PATIENT INSTRUCTIONS
visit is recommended every 6 months. Try to get at least 150 minutes of exercise per week or 10,000 steps per day on a pedometer . Order or download the FREE \"Exercise & Physical Activity: Your Everyday Guide\" from [employer]. Call 7-106.710.8307 or search [employer] online. You need 9951-7982 mg of calcium and 2878-6831 IU of vitamin D per day. It is possible to meet your calcium requirement with diet alone, but a vitamin D supplement is usually necessary to meet this goal.  When exposed to the sun, use a sunscreen that protects against both UVA and UVB radiation with an SPF of 30 or greater. Reapply every 2 to 3 hours or after sweating, drying off with a towel, or swimming. Always wear a seat belt when traveling in a car. Always wear a helmet when riding a bicycle or motorcycle.

## 2023-08-10 NOTE — PROGRESS NOTES
Patient recently had tendonitis in her bilateral hips and doing PT. She has improved. Tetanus vaccination status reviewed: Td vaccination indicated and given today    Awv done today.      Patient last pap was 2019 [facility]

## 2023-09-08 DIAGNOSIS — Z12.31 ENCOUNTER FOR SCREENING MAMMOGRAM FOR MALIGNANT NEOPLASM OF BREAST: Primary | ICD-10-CM

## 2023-09-11 ENCOUNTER — HOSPITAL ENCOUNTER (OUTPATIENT)
Age: 68
Discharge: HOME | End: 2023-09-11
Payer: MEDICARE

## 2023-09-11 ENCOUNTER — HOSPITAL ENCOUNTER (OUTPATIENT)
Dept: MAMMOGRAPHY | Age: 68
Discharge: HOME | End: 2023-09-11
Payer: MEDICARE

## 2023-09-11 DIAGNOSIS — Z11.59 NEED FOR HEPATITIS C SCREENING TEST: ICD-10-CM

## 2023-09-11 DIAGNOSIS — Z12.31 ENCOUNTER FOR SCREENING MAMMOGRAM FOR MALIGNANT NEOPLASM OF BREAST: ICD-10-CM

## 2023-09-11 LAB — HCV AB SERPL QL IA: NON REACTIVE

## 2023-09-11 PROCEDURE — 86803 HEPATITIS C AB TEST: CPT

## 2023-09-11 PROCEDURE — 36415 COLL VENOUS BLD VENIPUNCTURE: CPT

## 2023-09-11 PROCEDURE — 77063 BREAST TOMOSYNTHESIS BI: CPT

## 2023-09-27 ENCOUNTER — TELEPHONE (OUTPATIENT)
Dept: INTERNAL MEDICINE CLINIC | Age: 68
End: 2023-09-27

## 2023-11-09 ENCOUNTER — OFFICE VISIT (OUTPATIENT)
Dept: INTERNAL MEDICINE CLINIC | Age: 68
End: 2023-11-09
Payer: MEDICARE

## 2023-11-09 VITALS
HEIGHT: 61 IN | BODY MASS INDEX: 30.4 KG/M2 | SYSTOLIC BLOOD PRESSURE: 150 MMHG | DIASTOLIC BLOOD PRESSURE: 90 MMHG | WEIGHT: 161 LBS | HEART RATE: 77 BPM | OXYGEN SATURATION: 99 % | RESPIRATION RATE: 18 BRPM

## 2023-11-09 DIAGNOSIS — N95.2 VAGINAL ATROPHY: Primary | ICD-10-CM

## 2023-11-09 DIAGNOSIS — T50.Z95A ADVERSE EFFECT OF VACCINE, INITIAL ENCOUNTER: ICD-10-CM

## 2023-11-09 PROCEDURE — 1123F ACP DISCUSS/DSCN MKR DOCD: CPT | Performed by: GENERAL PRACTICE

## 2023-11-09 PROCEDURE — 99213 OFFICE O/P EST LOW 20 MIN: CPT | Performed by: GENERAL PRACTICE

## 2023-11-09 RX ORDER — HYDROCORTISONE 25 MG/G
CREAM TOPICAL
COMMUNITY
Start: 2023-10-27

## 2023-11-09 RX ORDER — ESTRADIOL 0.1 MG/G
1 CREAM VAGINAL DAILY
Qty: 42.5 G | Refills: 3 | Status: SHIPPED | OUTPATIENT
Start: 2023-11-09

## 2023-12-13 ENCOUNTER — TELEPHONE (OUTPATIENT)
Dept: INTERNAL MEDICINE CLINIC | Age: 68
End: 2023-12-13

## 2024-01-16 RX ORDER — ALBUTEROL SULFATE 90 UG/1
2 AEROSOL, METERED RESPIRATORY (INHALATION) EVERY 4 HOURS PRN
Qty: 18 G | Refills: 3 | Status: SHIPPED | OUTPATIENT
Start: 2024-01-16

## 2024-01-24 ENCOUNTER — INITIAL CONSULT (OUTPATIENT)
Age: 69
End: 2024-01-24
Payer: MEDICARE

## 2024-01-24 ENCOUNTER — HOSPITAL ENCOUNTER (OUTPATIENT)
Age: 69
Setting detail: SPECIMEN
Discharge: HOME | End: 2024-01-24
Payer: MEDICARE

## 2024-01-24 VITALS
DIASTOLIC BLOOD PRESSURE: 82 MMHG | SYSTOLIC BLOOD PRESSURE: 138 MMHG | RESPIRATION RATE: 18 BRPM | WEIGHT: 157 LBS | HEIGHT: 61 IN | BODY MASS INDEX: 29.64 KG/M2

## 2024-01-24 DIAGNOSIS — N95.2 VAGINAL ATROPHY: ICD-10-CM

## 2024-01-24 DIAGNOSIS — Z78.0 POSTMENOPAUSAL: ICD-10-CM

## 2024-01-24 DIAGNOSIS — Z01.419 WELL WOMAN EXAM WITH ROUTINE GYNECOLOGICAL EXAM: Primary | ICD-10-CM

## 2024-01-24 DIAGNOSIS — Z12.4 CERVICAL CANCER SCREENING: ICD-10-CM

## 2024-01-24 PROCEDURE — 87624 HPV HI-RISK TYP POOLED RSLT: CPT

## 2024-01-24 PROCEDURE — G0101 CA SCREEN;PELVIC/BREAST EXAM: HCPCS | Performed by: STUDENT IN AN ORGANIZED HEALTH CARE EDUCATION/TRAINING PROGRAM

## 2024-01-24 PROCEDURE — 88142 CYTOPATH C/V THIN LAYER: CPT

## 2024-01-24 NOTE — PROGRESS NOTES
Department of Obstetrics and Gynecology  Office Visit  Name:  Lauren Elizalde   CSN: [id number]    : 1955   Age: 69 y.o.       Chief Complaint   Patient presents with    Consultation     Pt here for consult. Referred by Dr. Fritz d/t vaginal atrophy. Last Pap -neg, mammo 23-neg, dexa 21-neg, colonoscopy 22-polyps. Postmenopausal-on estradiol vaginal cream, sexually active, still has ovaries. Pt reports that she is using estradiol vaginal cream 2x/week and Replens 2x/week as well-wanting to make sure that it is ok to use both of them.        SUBJECTIVE:  Lauren Elizalde is a 69 y.o.  who presents for consultation for vaginal atrophy. Patient was sent estrace by PCP for this which she is using 2x a week. She is also using replans non hormonal vaginal moisturizer 2x a week. Also reports wanting to know if she needs a pap smear.     GYN HX:    Menses:   No LMP recorded. Patient is postmenopausal.  Sexual history:  Currently is sexually active.  Hx of tubal ligation  Last pap , negative  Last mammogram 23 negative  DEXA 21 negative  Colonoscpy 22 with polyp  Maternal aunt with breast cancer    Patient's medications, allergies, past medical, surgical, social and family histories were reviewed and updated as appropriate.    Past Medical History:   Diagnosis Date    Chronic back pain     Degenerative disc disease, lumbar     Exercise-induced asthma     Left sided sciatica     Mixed hyperlipidemia     Post-menopausal     Wears glasses        Past Surgical History:   Procedure Laterality Date    BREAST CYST EXCISION      CARPAL TUNNEL RELEASE Bilateral     TONSILLECTOMY      TUBAL LIGATION         Social History     Socioeconomic History    Marital status:      Spouse name: Not on file    Number of children: Not on file    Years of education: Not on file    Highest education level: Not on file   Occupational History    Not on file   Tobacco Use    Smoking

## 2024-01-27 LAB
HPV HIGH RISK: NOT DETECTED
HPV, GENOTYPE 16: NOT DETECTED
HPV, GENOTYPE 18: NOT DETECTED

## 2024-02-13 ENCOUNTER — OFFICE VISIT (OUTPATIENT)
Dept: INTERNAL MEDICINE CLINIC | Age: 69
End: 2024-02-13
Payer: MEDICARE

## 2024-02-13 VITALS
SYSTOLIC BLOOD PRESSURE: 120 MMHG | DIASTOLIC BLOOD PRESSURE: 80 MMHG | HEART RATE: 53 BPM | RESPIRATION RATE: 16 BRPM | OXYGEN SATURATION: 99 % | HEIGHT: 61 IN | BODY MASS INDEX: 29.64 KG/M2 | WEIGHT: 157 LBS

## 2024-02-13 DIAGNOSIS — M76.30 ILIOTIBIAL BAND SYNDROME, UNSPECIFIED LATERALITY: ICD-10-CM

## 2024-02-13 DIAGNOSIS — N95.2 VAGINAL ATROPHY: ICD-10-CM

## 2024-02-13 DIAGNOSIS — J45.990 EXERCISE-INDUCED ASTHMA: ICD-10-CM

## 2024-02-13 DIAGNOSIS — E78.5 DYSLIPIDEMIA: Primary | ICD-10-CM

## 2024-02-13 DIAGNOSIS — Z78.0 POST-MENOPAUSAL: ICD-10-CM

## 2024-02-13 PROCEDURE — 99213 OFFICE O/P EST LOW 20 MIN: CPT | Performed by: GENERAL PRACTICE

## 2024-02-13 PROCEDURE — 1123F ACP DISCUSS/DSCN MKR DOCD: CPT | Performed by: GENERAL PRACTICE

## 2024-02-13 NOTE — PROGRESS NOTES
Lauren Elizalde (:  1955) is a 69 y.o. female,Established patient, here for evaluation of the following chief complaint(s):  Follow-up (No c/o per patient)          ASSESSMENT/PLAN:  1. Dyslipidemia  Labs ordered, please obtain in the next couple of days.  - CBC  - Comprehensive Metabolic Panel  - Lipid Panel  - T4, Free  - TSH    2. Post-menopausal  Recommend weighted exercise to prevent osteoperosis along with Ca and vitamin D    3. Exercise-induced asthma  Controlled, use CAROL prn    4. Vaginal atrophy  Tolerating premarin and vaginal mosturizer, f/u with Dr. Christian as indicated    5. Iliotibial band syndrome, unspecified laterality  Resolved, continue preventative stretching.      Return in about 1 year (around 2025) for Interval follow-up, Labs now.         Subjective   SUBJECTIVE/OBJECTIVE:  HPI  Lauren Elizalde is a 67 y.o. pleasant lady presenting today with a chief complaint of HL, Obesity.    She has a past medical history significant for:  HL (, TG 65 on 2021), not on statin   Degenerative disc disease lumbar and L sciatica, on Ibuprofen PRN  Post-menopausal, on hormonal therapy  Exercise-induced asthma (?emphysema), on Albuterol inh PRN   GERD, on Pepcid 20mg QD PRN   Diverticulosis   Hemorrhoids   S/p tubal ligation  S/p bilateral carpal tunnel release  S/p tonsillectomy  Former smoker (quit )  Medical marijuana      # Patient with sciatica and DDD lumbar. Intermittent. Better once she retired. She used to be a . Mostly L sciatica is bothering her. Takes Ibuprofen PRN.  Did not tolerate Gabapentin. Pain kept her up at night. She had XR lumbar done .  She is using medical marijuana.   # Normal DEXA 2021.   # UTD on pap smears: pap and HPV done  ([place]).   # Colonoscopy 2022. 1 adenomatous polyp. FHx colon cancer. Due in 3 years.  # Per patient she was having severe post-menopausal symptoms that her PCP had to place her on hormonal

## 2024-03-20 ENCOUNTER — HOSPITAL ENCOUNTER (OUTPATIENT)
Age: 69
Discharge: HOME | End: 2024-03-20
Payer: MEDICARE

## 2024-03-20 DIAGNOSIS — R53.83 FATIGUE, UNSPECIFIED TYPE: ICD-10-CM

## 2024-03-20 DIAGNOSIS — E78.5 DYSLIPIDEMIA: ICD-10-CM

## 2024-03-20 DIAGNOSIS — E78.5 DYSLIPIDEMIA: Primary | ICD-10-CM

## 2024-03-20 LAB
ALBUMIN SERPL-MCNC: 4.3 GM/DL (ref 3.4–5)
ALP BLD-CCNC: 90 IU/L (ref 40–129)
ALT SERPL-CCNC: 10 U/L (ref 10–40)
ANION GAP SERPL CALCULATED.3IONS-SCNC: 13 MMOL/L (ref 7–16)
AST SERPL-CCNC: 17 IU/L (ref 15–37)
BASOPHILS ABSOLUTE: 0 K/CU MM
BASOPHILS RELATIVE PERCENT: 0.7 % (ref 0–1)
BILIRUB SERPL-MCNC: 0.4 MG/DL (ref 0–1)
BUN SERPL-MCNC: 18 MG/DL (ref 6–23)
CALCIUM SERPL-MCNC: 9 MG/DL (ref 8.3–10.6)
CHLORIDE BLD-SCNC: 104 MMOL/L (ref 99–110)
CHOLEST SERPL-MCNC: 240 MG/DL
CO2: 24 MMOL/L (ref 21–32)
CREAT SERPL-MCNC: 0.7 MG/DL (ref 0.6–1.1)
DIFFERENTIAL TYPE: ABNORMAL
EOSINOPHILS ABSOLUTE: 0.1 K/CU MM
EOSINOPHILS RELATIVE PERCENT: 1.3 % (ref 0–3)
GFR SERPL CREATININE-BSD FRML MDRD: >60 ML/MIN/1.73M2
GLUCOSE SERPL-MCNC: 98 MG/DL (ref 70–99)
HCT VFR BLD CALC: 42.6 % (ref 37–47)
HDLC SERPL-MCNC: 76 MG/DL
HEMOGLOBIN: 13.9 GM/DL (ref 12.5–16)
IMMATURE NEUTROPHIL %: 0.2 % (ref 0–0.43)
LDLC SERPL CALC-MCNC: 151 MG/DL
LYMPHOCYTES ABSOLUTE: 1.2 K/CU MM
LYMPHOCYTES RELATIVE PERCENT: 19.7 % (ref 24–44)
MCH RBC QN AUTO: 31.2 PG (ref 27–31)
MCHC RBC AUTO-ENTMCNC: 32.6 % (ref 32–36)
MCV RBC AUTO: 95.5 FL (ref 78–100)
MONOCYTES ABSOLUTE: 0.6 K/CU MM
MONOCYTES RELATIVE PERCENT: 9.2 % (ref 0–4)
PDW BLD-RTO: 13.9 % (ref 11.7–14.9)
PLATELET # BLD: 264 K/CU MM (ref 140–440)
PMV BLD AUTO: 9.9 FL (ref 7.5–11.1)
POTASSIUM SERPL-SCNC: 4.5 MMOL/L (ref 3.5–5.1)
RBC # BLD: 4.46 M/CU MM (ref 4.2–5.4)
SEGMENTED NEUTROPHILS ABSOLUTE COUNT: 4.1 K/CU MM
SEGMENTED NEUTROPHILS RELATIVE PERCENT: 68.9 % (ref 36–66)
SODIUM BLD-SCNC: 141 MMOL/L (ref 135–145)
T4 FREE SERPL-MCNC: 1.27 NG/DL (ref 0.9–1.8)
TOTAL IMMATURE NEUTOROPHIL: 0.01 K/CU MM
TOTAL PROTEIN: 7.3 GM/DL (ref 6.4–8.2)
TRIGL SERPL-MCNC: 67 MG/DL
TSH SERPL DL<=0.005 MIU/L-ACNC: 1.51 UIU/ML (ref 0.27–4.2)
WBC # BLD: 6 K/CU MM (ref 4–10.5)

## 2024-03-20 PROCEDURE — 84443 ASSAY THYROID STIM HORMONE: CPT

## 2024-03-20 PROCEDURE — 80061 LIPID PANEL: CPT

## 2024-03-20 PROCEDURE — 36415 COLL VENOUS BLD VENIPUNCTURE: CPT

## 2024-03-20 PROCEDURE — 84439 ASSAY OF FREE THYROXINE: CPT

## 2024-03-20 PROCEDURE — 85025 COMPLETE CBC W/AUTO DIFF WBC: CPT

## 2024-03-20 PROCEDURE — 80053 COMPREHEN METABOLIC PANEL: CPT

## 2024-04-15 ENCOUNTER — OFFICE VISIT (OUTPATIENT)
Age: 69
End: 2024-04-15

## 2024-04-15 VITALS
DIASTOLIC BLOOD PRESSURE: 70 MMHG | HEIGHT: 61 IN | WEIGHT: 160 LBS | SYSTOLIC BLOOD PRESSURE: 120 MMHG | HEART RATE: 65 BPM | OXYGEN SATURATION: 98 % | RESPIRATION RATE: 16 BRPM | BODY MASS INDEX: 30.21 KG/M2

## 2024-04-15 DIAGNOSIS — M75.91 SUPRASPINATUS TENDINITIS, RIGHT: ICD-10-CM

## 2024-04-15 DIAGNOSIS — K57.92 DIVERTICULITIS: ICD-10-CM

## 2024-04-15 DIAGNOSIS — M75.51 SUBACROMIAL BURSITIS OF RIGHT SHOULDER JOINT: Primary | ICD-10-CM

## 2024-04-15 RX ORDER — TRIAMCINOLONE ACETONIDE 40 MG/ML
40 INJECTION, SUSPENSION INTRA-ARTICULAR; INTRAMUSCULAR ONCE
Status: DISCONTINUED | OUTPATIENT
Start: 2024-04-15 | End: 2024-04-15

## 2024-04-15 ASSESSMENT — ENCOUNTER SYMPTOMS
CONSTIPATION: 0
BLOOD IN STOOL: 0
DIARRHEA: 1
ABDOMINAL PAIN: 1
NAUSEA: 1

## 2024-04-15 NOTE — PROGRESS NOTES
Lauren Elizalde (:  1955) is a 69 y.o. female,Established patient, here for evaluation of the following chief complaint(s):  Shoulder Pain (Right/ patient stated it has been painful for a year) and Other (Colitis- flare up stated she is feeling some better)          ASSESSMENT/PLAN:  1. Subacromial bursitis of right shoulder joint  Recommend HEP and stretching      2. Supraspinatus tendinitis, right  Recommend HEP and stretching      3. Diverticulitis  Recommend stool softener, if recurs can refer to Colorectal surgery to discuss surgical treatment options.      No follow-ups on file.         Subjective   SUBJECTIVE/OBJECTIVE:  HPI  Lauren Elizalde is a 67 y.o. pleasant lady presenting today with a chief complaint of diarrhea and shoulder pain.     She has a past medical history significant for:  HL (, TG 65 on 2021), not on statin   Degenerative disc disease lumbar and L sciatica, on Ibuprofen PRN  Post-menopausal, on hormonal therapy  Exercise-induced asthma (?emphysema), on Albuterol inh PRN   GERD, on Pepcid 20mg QD PRN   Diverticulosis   Hemorrhoids   S/p tubal ligation  S/p bilateral carpal tunnel release  S/p tonsillectomy  Former smoker (quit )  Medical marijuana      # Patient with sciatica and DDD lumbar. Intermittent. Better once she retired. She used to be a . Mostly L sciatica is bothering her. Takes Ibuprofen PRN.  Did not tolerate Gabapentin. Pain kept her up at night. She had XR lumbar done .  She is using medical marijuana.   # Normal DEXA 2021.   # UTD on pap smears: pap and HPV done  ([place]).   # Colonoscopy 2022. 1 adenomatous polyp. FHx colon cancer. Due in 3 years.  # Per patient she was having severe post-menopausal symptoms that her PCP had to place her on hormonal therapy. Was not seeing Gyn for this.   She is on Estrogen cream and vaginal supp, as well as progesterone cream.  # Has exercise-induced asthma. Uses Albuterol inh

## 2024-05-06 ENCOUNTER — PATIENT MESSAGE (OUTPATIENT)
Age: 69
End: 2024-05-06

## 2024-05-06 RX ORDER — TRETINOIN 0.5 MG/G
CREAM TOPICAL
OUTPATIENT
Start: 2024-05-06

## 2024-05-06 NOTE — TELEPHONE ENCOUNTER
From: Lauren Elizalde  To: Dr. Joel Christian  Sent: 5/6/2024 9:07 AM EDT  Subject: Refill of Tretinoin     Hi,   I am requesting a refill for Tretinoin 0.05mg, 45gm .  Please send to [facility]. Thanks Mariam MOSES

## 2024-06-21 ENCOUNTER — TELEPHONE (OUTPATIENT)
Age: 69
End: 2024-06-21

## 2024-07-25 ENCOUNTER — OFFICE (OUTPATIENT)
Dept: URBAN - METROPOLITAN AREA CLINIC 18 | Facility: CLINIC | Age: 69
End: 2024-07-25

## 2024-07-25 VITALS
WEIGHT: 156 LBS | OXYGEN SATURATION: 100 % | HEIGHT: 61 IN | DIASTOLIC BLOOD PRESSURE: 80 MMHG | SYSTOLIC BLOOD PRESSURE: 132 MMHG | HEART RATE: 66 BPM

## 2024-07-25 DIAGNOSIS — K64.8 OTHER HEMORRHOIDS: ICD-10-CM

## 2024-07-25 DIAGNOSIS — F43.9 REACTION TO SEVERE STRESS, UNSPECIFIED: ICD-10-CM

## 2024-07-25 DIAGNOSIS — R19.4 CHANGE IN BOWEL HABIT: ICD-10-CM

## 2024-07-25 DIAGNOSIS — R55 SYNCOPE AND COLLAPSE: ICD-10-CM

## 2024-07-25 PROCEDURE — 99204 OFFICE O/P NEW MOD 45 MIN: CPT | Performed by: INTERNAL MEDICINE

## 2024-08-13 ENCOUNTER — OFFICE VISIT (OUTPATIENT)
Age: 69
End: 2024-08-13
Payer: MEDICARE

## 2024-08-13 VITALS — SYSTOLIC BLOOD PRESSURE: 130 MMHG | HEART RATE: 73 BPM | DIASTOLIC BLOOD PRESSURE: 70 MMHG | OXYGEN SATURATION: 98 %

## 2024-08-13 DIAGNOSIS — M19.042 ARTHRITIS OF BOTH HANDS: Primary | ICD-10-CM

## 2024-08-13 DIAGNOSIS — J30.2 SEASONAL ALLERGIES: ICD-10-CM

## 2024-08-13 DIAGNOSIS — M19.041 ARTHRITIS OF BOTH HANDS: Primary | ICD-10-CM

## 2024-08-13 PROCEDURE — 1123F ACP DISCUSS/DSCN MKR DOCD: CPT | Performed by: PHYSICIAN ASSISTANT

## 2024-08-13 PROCEDURE — 99204 OFFICE O/P NEW MOD 45 MIN: CPT | Performed by: PHYSICIAN ASSISTANT

## 2024-08-13 RX ORDER — FLUTICASONE PROPIONATE 50 MCG
2 SPRAY, SUSPENSION (ML) NASAL DAILY
Qty: 16 G | Refills: 0 | Status: SHIPPED | OUTPATIENT
Start: 2024-08-13

## 2024-08-13 ASSESSMENT — ENCOUNTER SYMPTOMS
SORE THROAT: 0
ABDOMINAL PAIN: 0
BACK PAIN: 0
SHORTNESS OF BREATH: 0
RHINORRHEA: 0
EYE REDNESS: 0
EYE DISCHARGE: 0
VOMITING: 0
BLOOD IN STOOL: 0
CHEST TIGHTNESS: 0
NAUSEA: 0
PHOTOPHOBIA: 0
EYE PAIN: 0
WHEEZING: 0
COLOR CHANGE: 0
COUGH: 0
CONSTIPATION: 0
DIARRHEA: 0

## 2024-08-13 NOTE — PROGRESS NOTES
Lauren Elizalde (:  1955) is a 69 y.o. female,Established patient, here for evaluation of the following chief complaint(s):    Other (Middle finger on both hand feels like they are trigger fingers. Right is worst than left. Started in . Pt stated had PT. It did help but not going away. )      SUBJECTIVE/OBJECTIVE:  HPI  Lauren Elizalde is a pleasant 69 y.o. female presenting to clinic today for bilateral hand pain and swelling.  Patient reports for about the past 3 to 4 months, has had a bilateral hand pain and swelling mostly at MCP joints, states bilateral middle fingers are worse, reports it is worse in the morning and sometimes wakes her up.  Reports sometimes it feels like her hands get stuck in flexed position, states she occasionally takes ibuprofen which helps.  Denies warmth or erythema.  Denies other systemic symptoms.  Did [facility] physical therapy recently which seem to help slightly.    Also reports persistent seasonal allergy symptoms, states that the Claritin seems like it is not working as well as it once did.  No Known Allergies    Current Outpatient Medications   Medication Sig Dispense Refill    fluticasone (FLONASE) 50 MCG/ACT nasal spray 2 sprays by Each Nostril route daily 16 g 0    albuterol sulfate HFA (PROVENTIL;VENTOLIN;PROAIR) 108 (90 Base) MCG/ACT inhaler Inhale 2 puffs into the lungs every 4 hours as needed for Wheezing or Shortness of Breath 18 g 3    PROCTO-MED HC 2.5 % CREA rectal cream APPLY 1 APPLICATION EXTERNALLY TWICE DAILY FOR 14 DAYS      estradiol (ESTRACE VAGINAL) 0.1 MG/GM vaginal cream Place 1 g vaginally daily 42.5 g 3    tretinoin (RETIN-A) 0.05 % cream       ketoconazole (NIZORAL) 2 % shampoo Apply topically daily as needed. 30 each 3    fexofenadine (ALLEGRA) 180 MG tablet       loratadine (CLARITIN) 10 MG tablet Take 1 tablet by mouth daily      clobetasol (TEMOVATE) 0.05 % external solution        No current facility-administered medications for this

## 2024-09-06 ENCOUNTER — HOSPITAL ENCOUNTER (OUTPATIENT)
Dept: GENERAL RADIOLOGY | Age: 69
Discharge: HOME | End: 2024-09-06
Payer: MEDICARE

## 2024-09-06 ENCOUNTER — HOSPITAL ENCOUNTER (OUTPATIENT)
Age: 69
Discharge: HOME | End: 2024-09-06
Payer: MEDICARE

## 2024-09-06 DIAGNOSIS — M19.042 ARTHRITIS OF BOTH HANDS: ICD-10-CM

## 2024-09-06 DIAGNOSIS — M19.041 ARTHRITIS OF BOTH HANDS: ICD-10-CM

## 2024-09-06 LAB
CRP SERPL HS-MCNC: 4 MG/L
SED RATE, AUTOMATED: 7 MM/HR (ref 0–30)

## 2024-09-06 PROCEDURE — 86140 C-REACTIVE PROTEIN: CPT

## 2024-09-06 PROCEDURE — 86225 DNA ANTIBODY NATIVE: CPT

## 2024-09-06 PROCEDURE — 73130 X-RAY EXAM OF HAND: CPT

## 2024-09-06 PROCEDURE — 36415 COLL VENOUS BLD VENIPUNCTURE: CPT

## 2024-09-06 PROCEDURE — 85652 RBC SED RATE AUTOMATED: CPT

## 2024-09-06 PROCEDURE — 86430 RHEUMATOID FACTOR TEST QUAL: CPT

## 2024-09-08 LAB — RHEUMATOID FACTOR: <10 IU/ML (ref 0–14)

## 2024-09-09 LAB — DSDNA IGG SER QL IA: 4 IU (ref 0–24)

## 2024-09-17 LAB — CYCLIC CITRULLINE AB IGG/IGA: 3 UNITS (ref 0–19)

## 2024-09-30 ENCOUNTER — PATIENT MESSAGE (OUTPATIENT)
Age: 69
End: 2024-09-30

## 2024-09-30 DIAGNOSIS — Z12.31 SCREENING MAMMOGRAM FOR BREAST CANCER: Primary | ICD-10-CM

## 2024-10-02 ENCOUNTER — HOSPITAL ENCOUNTER (OUTPATIENT)
Dept: MAMMOGRAPHY | Age: 69
Discharge: HOME | End: 2024-10-02
Payer: MEDICARE

## 2024-10-02 VITALS — BODY MASS INDEX: 29.07 KG/M2 | WEIGHT: 154 LBS | HEIGHT: 61 IN

## 2024-10-02 DIAGNOSIS — Z12.31 SCREENING MAMMOGRAM FOR BREAST CANCER: ICD-10-CM

## 2024-10-02 PROCEDURE — 77063 BREAST TOMOSYNTHESIS BI: CPT

## 2024-10-03 ENCOUNTER — PATIENT MESSAGE (OUTPATIENT)
Age: 69
End: 2024-10-03

## 2024-10-03 ENCOUNTER — HOSPITAL ENCOUNTER (OUTPATIENT)
Dept: MAMMOGRAPHY | Age: 69
Discharge: HOME | End: 2024-10-03
Payer: MEDICARE

## 2024-10-03 ENCOUNTER — HOSPITAL ENCOUNTER (OUTPATIENT)
Dept: ULTRASOUND IMAGING | Age: 69
Discharge: HOME | End: 2024-10-03
Payer: MEDICARE

## 2024-10-03 DIAGNOSIS — R92.8 ABNORMAL MAMMOGRAM OF LEFT BREAST: ICD-10-CM

## 2024-10-03 DIAGNOSIS — R92.8 ABNORMAL MAMMOGRAM OF LEFT BREAST: Primary | ICD-10-CM

## 2024-10-03 PROCEDURE — 76642 ULTRASOUND BREAST LIMITED: CPT

## 2024-10-03 PROCEDURE — G0279 TOMOSYNTHESIS, MAMMO: HCPCS

## 2024-12-13 DIAGNOSIS — N95.2 VAGINAL ATROPHY: ICD-10-CM

## 2024-12-16 RX ORDER — ESTRADIOL 0.1 MG/G
1 CREAM VAGINAL DAILY
Qty: 42.5 G | Refills: 3 | Status: SHIPPED | OUTPATIENT
Start: 2024-12-16

## 2025-01-10 SDOH — ECONOMIC STABILITY: INCOME INSECURITY: IN THE LAST 12 MONTHS, WAS THERE A TIME WHEN YOU WERE NOT ABLE TO PAY THE MORTGAGE OR RENT ON TIME?: NO

## 2025-01-10 SDOH — ECONOMIC STABILITY: FOOD INSECURITY: WITHIN THE PAST 12 MONTHS, YOU WORRIED THAT YOUR FOOD WOULD RUN OUT BEFORE YOU GOT MONEY TO BUY MORE.: NEVER TRUE

## 2025-01-10 SDOH — ECONOMIC STABILITY: FOOD INSECURITY: WITHIN THE PAST 12 MONTHS, THE FOOD YOU BOUGHT JUST DIDN'T LAST AND YOU DIDN'T HAVE MONEY TO GET MORE.: NEVER TRUE

## 2025-01-10 SDOH — ECONOMIC STABILITY: TRANSPORTATION INSECURITY
IN THE PAST 12 MONTHS, HAS THE LACK OF TRANSPORTATION KEPT YOU FROM MEDICAL APPOINTMENTS OR FROM GETTING MEDICATIONS?: NO

## 2025-01-13 ENCOUNTER — OFFICE VISIT (OUTPATIENT)
Age: 70
End: 2025-01-13

## 2025-01-13 ENCOUNTER — OFFICE VISIT (OUTPATIENT)
Age: 70
End: 2025-01-13
Payer: MEDICARE

## 2025-01-13 VITALS
BODY MASS INDEX: 29.07 KG/M2 | WEIGHT: 154 LBS | RESPIRATION RATE: 18 BRPM | OXYGEN SATURATION: 97 % | HEART RATE: 84 BPM | HEIGHT: 61 IN

## 2025-01-13 VITALS
DIASTOLIC BLOOD PRESSURE: 80 MMHG | HEIGHT: 61 IN | BODY MASS INDEX: 29.07 KG/M2 | OXYGEN SATURATION: 97 % | RESPIRATION RATE: 18 BRPM | HEART RATE: 84 BPM | SYSTOLIC BLOOD PRESSURE: 136 MMHG | WEIGHT: 154 LBS

## 2025-01-13 DIAGNOSIS — Z00.00 MEDICARE ANNUAL WELLNESS VISIT, SUBSEQUENT: Primary | ICD-10-CM

## 2025-01-13 DIAGNOSIS — E78.5 DYSLIPIDEMIA: ICD-10-CM

## 2025-01-13 DIAGNOSIS — Z13.1 DIABETES MELLITUS SCREENING: ICD-10-CM

## 2025-01-13 DIAGNOSIS — F41.8 SITUATIONAL ANXIETY: Primary | ICD-10-CM

## 2025-01-13 PROCEDURE — 1159F MED LIST DOCD IN RCRD: CPT | Performed by: GENERAL PRACTICE

## 2025-01-13 PROCEDURE — 99213 OFFICE O/P EST LOW 20 MIN: CPT | Performed by: GENERAL PRACTICE

## 2025-01-13 PROCEDURE — G0439 PPPS, SUBSEQ VISIT: HCPCS | Performed by: GENERAL PRACTICE

## 2025-01-13 PROCEDURE — 1123F ACP DISCUSS/DSCN MKR DOCD: CPT | Performed by: GENERAL PRACTICE

## 2025-01-13 RX ORDER — PREDNISOLONE ACETATE 10 MG/ML
SUSPENSION/ DROPS OPHTHALMIC
COMMUNITY
Start: 2025-01-12

## 2025-01-13 RX ORDER — PROPRANOLOL HYDROCHLORIDE 60 MG/1
60 CAPSULE, EXTENDED RELEASE ORAL DAILY
Qty: 30 CAPSULE | Refills: 3 | Status: SHIPPED | OUTPATIENT
Start: 2025-01-13

## 2025-01-13 ASSESSMENT — PATIENT HEALTH QUESTIONNAIRE - PHQ9
SUM OF ALL RESPONSES TO PHQ QUESTIONS 1-9: 0
SUM OF ALL RESPONSES TO PHQ QUESTIONS 1-9: 0
SUM OF ALL RESPONSES TO PHQ9 QUESTIONS 1 & 2: 0
2. FEELING DOWN, DEPRESSED OR HOPELESS: NOT AT ALL
1. LITTLE INTEREST OR PLEASURE IN DOING THINGS: NOT AT ALL
SUM OF ALL RESPONSES TO PHQ QUESTIONS 1-9: 0
SUM OF ALL RESPONSES TO PHQ QUESTIONS 1-9: 0

## 2025-01-13 ASSESSMENT — LIFESTYLE VARIABLES
HOW MANY STANDARD DRINKS CONTAINING ALCOHOL DO YOU HAVE ON A TYPICAL DAY: PATIENT DOES NOT DRINK
HOW OFTEN DO YOU HAVE A DRINK CONTAINING ALCOHOL: NEVER

## 2025-01-13 ASSESSMENT — ANXIETY QUESTIONNAIRES
3. WORRYING TOO MUCH ABOUT DIFFERENT THINGS: SEVERAL DAYS
5. BEING SO RESTLESS THAT IT IS HARD TO SIT STILL: SEVERAL DAYS
IF YOU CHECKED OFF ANY PROBLEMS ON THIS QUESTIONNAIRE, HOW DIFFICULT HAVE THESE PROBLEMS MADE IT FOR YOU TO DO YOUR WORK, TAKE CARE OF THINGS AT HOME, OR GET ALONG WITH OTHER PEOPLE: SOMEWHAT DIFFICULT
2. NOT BEING ABLE TO STOP OR CONTROL WORRYING: SEVERAL DAYS
4. TROUBLE RELAXING: SEVERAL DAYS
6. BECOMING EASILY ANNOYED OR IRRITABLE: SEVERAL DAYS
GAD7 TOTAL SCORE: 6
1. FEELING NERVOUS, ANXIOUS, OR ON EDGE: SEVERAL DAYS
7. FEELING AFRAID AS IF SOMETHING AWFUL MIGHT HAPPEN: NOT AT ALL

## 2025-01-13 NOTE — PATIENT INSTRUCTIONS
Eat fewer snack items, fast foods, canned soups, and other high-salt, high-fat, processed foods.     Read food labels and try to avoid saturated and trans fats. They increase your risk of heart disease by raising cholesterol levels.     Limit the amount of solid fat--butter, margarine, and shortening--you eat. Use olive, peanut, or canola oil when you cook. Bake, broil, and steam foods instead of frying them.     Eat a variety of fruit and vegetables every day. Dark green, deep orange, red, or yellow fruits and vegetables are especially good for you. Examples include spinach, carrots, peaches, and berries.     Foods high in fiber can reduce your cholesterol and provide important vitamins and minerals. High-fiber foods include whole-grain cereals and breads, oatmeal, beans, brown rice, citrus fruits, and apples.     Eat lean proteins. Heart-healthy proteins include seafood, lean meats and poultry, eggs, beans, peas, nuts, seeds, and soy products.     Limit drinks and foods with added sugar. These include candy, desserts, and soda pop.   Heart-healthy lifestyle    If your doctor recommends it, get more exercise. For many people, walking is a good choice. Or you may want to swim, bike, or do other activities. Bit by bit, increase the time you're active every day. Try for at least 30 minutes on most days of the week.     Try to quit or cut back on using tobacco and other nicotine products. This includes smoking and vaping. If you need help quitting, talk to your doctor about stop-smoking programs and medicines. These can increase your chances of quitting for good. Quitting is one of the most important things you can do to protect your heart. It is never too late to quit. Try to avoid secondhand smoke too.     Stay at a weight that's healthy for you. Talk to your doctor if you need help losing weight.     Try to get 7 to 9 hours of sleep each night.     Limit alcohol to 2 drinks a day for men and 1 drink a day for women.

## 2025-01-13 NOTE — PROGRESS NOTES
Medicare Annual Wellness Visit    Lauren Elizalde is here for Medicare AWV    Assessment & Plan   Medicare annual wellness visit, subsequent     Return in 1 year (on 1/13/2026) for Medicare AWV.     Subjective       Patient's complete Health Risk Assessment and screening values have been reviewed and are found in Flowsheets. The following problems were reviewed today and where indicated follow up appointments were made and/or referrals ordered.    Positive Risk Factor Screenings with Interventions:        Drug Use:   Substance and Sexual Activity   Drug Use Yes    Frequency: 3.0 times per week    Types: Marijuana (Weed)    Comment: medical marijuana     DAST-10 Score: 1  Interpretation: 1-2 indicates low level use. Recommendation: monitor and re-assess at a later date  Interventions:  Patient advised to follow up in the office for further evaluation and treatment        General HRA Questions:  Select all that apply: (!) Stress  Interventions - Stress:  Patient declined any further interventions or treatment            Safety:  Do you have any tripping hazards - loose or unsecured carpets or rugs?: (!) Yes  Interventions:  Patient advised to follow up in the office for further evaluation and treatment                   Objective   Vitals:    01/13/25 1111   Pulse: 84   Resp: 18   SpO2: 97%   Weight: 69.9 kg (154 lb)   Height: 1.549 m (5' 1\")      Body mass index is 29.1 kg/m².                  No Known Allergies  Prior to Visit Medications    Medication Sig Taking? Authorizing Provider   prednisoLONE acetate (PRED FORTE) 1 % ophthalmic suspension  Yes Provider, MD Glenna   estradiol (ESTRACE VAGINAL) 0.1 MG/GM vaginal cream Place 1 g vaginally daily Yes Joel Fritz MD   fluticasone (FLONASE) 50 MCG/ACT nasal spray 2 sprays by Each Nostril route daily Yes Johnny Herrera PA   albuterol sulfate HFA (PROVENTIL;VENTOLIN;PROAIR) 108 (90 Base) MCG/ACT inhaler Inhale 2 puffs into the lungs every 4 hours as

## 2025-01-30 RX ORDER — ALBUTEROL SULFATE 90 UG/1
INHALANT RESPIRATORY (INHALATION)
Qty: 9 G | Refills: 0 | OUTPATIENT
Start: 2025-01-30

## 2025-01-30 RX ORDER — ALBUTEROL SULFATE 90 UG/1
INHALANT RESPIRATORY (INHALATION)
Qty: 9 G | Refills: 0 | Status: SHIPPED | OUTPATIENT
Start: 2025-01-30

## 2025-02-11 DIAGNOSIS — F41.8 SITUATIONAL ANXIETY: Primary | ICD-10-CM

## 2025-02-11 RX ORDER — BUSPIRONE HYDROCHLORIDE 10 MG/1
10 TABLET ORAL 2 TIMES DAILY
Qty: 60 TABLET | Refills: 0 | Status: SHIPPED | OUTPATIENT
Start: 2025-02-11 | End: 2025-03-13

## 2025-04-09 ENCOUNTER — HOSPITAL ENCOUNTER (OUTPATIENT)
Age: 70
Discharge: HOME | End: 2025-04-09
Payer: MEDICARE

## 2025-04-09 DIAGNOSIS — R53.83 FATIGUE, UNSPECIFIED TYPE: ICD-10-CM

## 2025-04-09 DIAGNOSIS — Z13.1 DIABETES MELLITUS SCREENING: ICD-10-CM

## 2025-04-09 DIAGNOSIS — E78.5 DYSLIPIDEMIA: Primary | ICD-10-CM

## 2025-04-09 LAB
ALBUMIN SERPL-MCNC: 4.2 G/DL (ref 3.4–5)
ALBUMIN/GLOB SERPL: 1.4 {RATIO}
ALP SERPL-CCNC: 89 U/L (ref 40–129)
ALT SERPL-CCNC: 15 U/L (ref 10–40)
ANION GAP SERPL CALCULATED.3IONS-SCNC: 13 MMOL/L (ref 9–17)
AST SERPL-CCNC: 23 U/L (ref 15–37)
BILIRUB SERPL-MCNC: 0.5 MG/DL (ref 0–1)
BUN SERPL-MCNC: 22 MG/DL (ref 7–20)
CALCIUM SERPL-MCNC: 9.4 MG/DL (ref 8.3–10.6)
CHLORIDE SERPL-SCNC: 102 MMOL/L (ref 99–110)
CHOLEST SERPL-MCNC: 224 MG/DL (ref 125–199)
CO2 SERPL-SCNC: 22 MMOL/L (ref 21–32)
CREAT SERPL-MCNC: 0.8 MG/DL (ref 0.6–1.2)
EST. AVERAGE GLUCOSE BLD GHB EST-MCNC: 115 MG/DL
GFR, ESTIMATED: 78 ML/MIN/1.73M2
GLUCOSE SERPL-MCNC: 96 MG/DL (ref 74–99)
HBA1C MFR BLD: 5.6 % (ref 4.2–6.3)
HDLC SERPL-MCNC: 72 MG/DL
LDLC SERPL CALC-MCNC: 141 MG/DL
POTASSIUM SERPL-SCNC: 5.1 MMOL/L (ref 3.5–5.1)
PROT SERPL-MCNC: 7.3 G/DL (ref 6.4–8.2)
SODIUM SERPL-SCNC: 137 MMOL/L (ref 136–145)
T4 FREE SERPL-MCNC: 1.1 NG/DL (ref 0.9–1.8)
TRIGL SERPL-MCNC: 54 MG/DL
TSH SERPL DL<=0.05 MIU/L-ACNC: 1.16 UIU/ML (ref 0.27–4.2)

## 2025-04-09 PROCEDURE — 80053 COMPREHEN METABOLIC PANEL: CPT

## 2025-04-09 PROCEDURE — 84439 ASSAY OF FREE THYROXINE: CPT

## 2025-04-09 PROCEDURE — 83036 HEMOGLOBIN GLYCOSYLATED A1C: CPT

## 2025-04-09 PROCEDURE — 80061 LIPID PANEL: CPT

## 2025-04-09 PROCEDURE — 84443 ASSAY THYROID STIM HORMONE: CPT

## 2025-04-09 PROCEDURE — 36415 COLL VENOUS BLD VENIPUNCTURE: CPT

## 2025-04-14 ENCOUNTER — OFFICE VISIT (OUTPATIENT)
Age: 70
End: 2025-04-14
Payer: MEDICARE

## 2025-04-14 VITALS
WEIGHT: 162.8 LBS | RESPIRATION RATE: 20 BRPM | HEIGHT: 61 IN | BODY MASS INDEX: 30.73 KG/M2 | HEART RATE: 58 BPM | OXYGEN SATURATION: 98 % | DIASTOLIC BLOOD PRESSURE: 80 MMHG | SYSTOLIC BLOOD PRESSURE: 120 MMHG

## 2025-04-14 DIAGNOSIS — F41.8 SITUATIONAL ANXIETY: ICD-10-CM

## 2025-04-14 DIAGNOSIS — R06.2 WHEEZING: Primary | ICD-10-CM

## 2025-04-14 DIAGNOSIS — J30.2 SEASONAL ALLERGIES: ICD-10-CM

## 2025-04-14 PROCEDURE — 1159F MED LIST DOCD IN RCRD: CPT | Performed by: GENERAL PRACTICE

## 2025-04-14 PROCEDURE — 99214 OFFICE O/P EST MOD 30 MIN: CPT | Performed by: GENERAL PRACTICE

## 2025-04-14 PROCEDURE — 1123F ACP DISCUSS/DSCN MKR DOCD: CPT | Performed by: GENERAL PRACTICE

## 2025-04-14 RX ORDER — FLUTICASONE PROPIONATE 50 MCG
2 SPRAY, SUSPENSION (ML) NASAL DAILY
Qty: 16 G | Refills: 0 | Status: SHIPPED | OUTPATIENT
Start: 2025-04-14

## 2025-04-14 RX ORDER — ALBUTEROL SULFATE 90 UG/1
1 INHALANT RESPIRATORY (INHALATION) EVERY 4 HOURS PRN
Qty: 9 G | Refills: 11 | Status: SHIPPED | OUTPATIENT
Start: 2025-04-14

## 2025-04-14 RX ORDER — BUSPIRONE HYDROCHLORIDE 10 MG/1
10 TABLET ORAL 2 TIMES DAILY
Qty: 60 TABLET | Refills: 5 | Status: SHIPPED | OUTPATIENT
Start: 2025-04-14 | End: 2025-10-11

## 2025-04-14 NOTE — PROGRESS NOTES
Lauren Elizalde (:  1955) is a 70 y.o. female,Established patient, here for evaluation of the following chief complaint(s):  Annual Exam       Assessment & Plan   ASSESSMENT/PLAN:  1. Seasonal allergies  Refill floanse  - fluticasone (FLONASE) 50 MCG/ACT nasal spray; 2 sprays by Each Nostril route daily  Dispense: 16 g; Refill: 0    2. Wheezing  refill  - albuterol sulfate HFA (PROVENTIL;VENTOLIN;PROAIR) 108 (90 Base) MCG/ACT inhaler; Inhale 1 puff into the lungs every 4 hours as needed for Wheezing or Shortness of Breath  Dispense: 9 g; Refill: 11    3. Situational anxiety  Refill buspar  - busPIRone (BUSPAR) 10 MG tablet; Take 1 tablet by mouth 2 times daily  Dispense: 60 tablet; Refill: 5    Return in about 7 months (around 2025) for Interval follow-up.         Subjective   SUBJECTIVE/OBJECTIVE:  HPI  Lauren Elizalde is a 70 y.o. pleasant lady presenting today with a chief complaint of HL, Obesity.    She has a past medical history significant for:  HL (, TG 67 on 3/2024), not on statin   Degenerative disc disease lumbar and L sciatica, on Ibuprofen PRN  Post-menopausal, on hormonal therapy  Exercise-induced asthma (?emphysema), on Albuterol inh PRN   GERD, on Pepcid 20mg QD PRN   Diverticulosis   Hemorrhoids   S/p tubal ligation  S/p bilateral carpal tunnel release  S/p tonsillectomy  Former smoker (quit )  Medical marijuana      # Patient with sciatica and DDD lumbar. Intermittent. Better once she retired. She used to be a . Mostly L sciatica is bothering her. Takes Ibuprofen PRN.  Did not tolerate Gabapentin. Pain kept her up at night. She had XR lumbar done .  She is using medical marijuana.   # Normal DEXA 2021.   # UTD on pap smears: pap and HPV done  ([place]).   # Colonoscopy 2022. 1 adenomatous polyp. FHx colon cancer. Due in 3 years.  # Per patient she was having severe post-menopausal symptoms that her PCP had to place her on hormonal